# Patient Record
Sex: MALE | Race: WHITE
[De-identification: names, ages, dates, MRNs, and addresses within clinical notes are randomized per-mention and may not be internally consistent; named-entity substitution may affect disease eponyms.]

---

## 2019-08-15 ENCOUNTER — HOSPITAL ENCOUNTER (OUTPATIENT)
Dept: HOSPITAL 53 - M OPP | Age: 64
Discharge: HOME | End: 2019-08-15
Attending: SURGERY
Payer: MEDICARE

## 2019-08-15 VITALS — BODY MASS INDEX: 24.5 KG/M2 | WEIGHT: 175 LBS | HEIGHT: 71 IN

## 2019-08-15 VITALS — SYSTOLIC BLOOD PRESSURE: 163 MMHG | DIASTOLIC BLOOD PRESSURE: 77 MMHG

## 2019-08-15 DIAGNOSIS — Z12.11: Primary | ICD-10-CM

## 2019-08-15 DIAGNOSIS — K57.30: ICD-10-CM

## 2019-08-15 DIAGNOSIS — Z79.82: ICD-10-CM

## 2019-08-15 DIAGNOSIS — I10: ICD-10-CM

## 2019-08-15 DIAGNOSIS — Z86.010: ICD-10-CM

## 2019-08-15 DIAGNOSIS — Z79.899: ICD-10-CM

## 2019-08-15 DIAGNOSIS — F17.210: ICD-10-CM

## 2019-08-15 NOTE — ROOR
________________________________________________________________________________

Patient Name: Conner Venegas           Procedure Date: 8/15/2019 8:04 AM

MRN: [id number]                          Account Number: [id number]

YOB: 1955                Age: 64

Room: [facility]                            Gender: Male

Note Status: Finalized                 

________________________________________________________________________________

 

Procedure:           Colonoscopy

Indications:         High risk colon cancer surveillance: Personal history of 

                     colonic polyps

Providers:           Leo J. Gosselin Jr, MD

Referring MD:        KIKA JOSHUA MD

Requesting Provider: 

Medicines:           Propofol per Anesthesia

Complications:       No immediate complications.

________________________________________________________________________________

Procedure:           Pre-Anesthesia Assessment:

                     - Prior to the procedure, a History and Physical was 

                     performed, and patient medications and allergies were 

                     reviewed. The patient is competent. The risks and 

                     benefits of the procedure and the sedation options and 

                     risks were discussed with the patient. All questions were 

                     answered and informed consent was obtained. Patient 

                     identification and proposed procedure were verified by 

                     the physician and the nurse in the pre-procedure area and 

                     in the procedure room. Mental Status Examination: alert 

                     and oriented. Airway Examination: normal oropharyngeal 

                     airway and neck mobility. Respiratory Examination: clear 

                     to auscultation. CV Examination: normal. ASA Grade 

                     Assessment: II - A patient with mild systemic disease. 

                     After reviewing the risks and benefits, the patient was 

                     deemed in satisfactory condition to undergo the 

                     procedure. The anesthesia plan was to use moderate 

                     sedation / analgesia (conscious sedation). Immediately 

                     prior to administration of medications, the patient was 

                     re-assessed for adequacy to receive sedatives. The heart 

                     rate, respiratory rate, oxygen saturations, blood 

                     pressure, adequacy of pulmonary ventilation, and response 

                     to care were monitored throughout the procedure. The 

                     physical status of the patient was re-assessed after the 

                     procedure.

                     The Colonoscope was introduced through the anus and 

                     advanced to the cecum, identified by appendiceal orifice 

                     and ileocecal valve. The colonoscopy was performed 

                     without difficulty. The patient tolerated the procedure 

                     well. The quality of the bowel preparation was adequate.

                                                                                

Findings:

     The rectum, recto-sigmoid colon, cecum, appendiceal orifice and ileocecal 

     valve appeared normal.

     Multiple small and large-mouthed diverticula were found in the sigmoid 

     colon.

     A few small and large-mouthed diverticula were found in the descending 

     colon, transverse colon and ascending colon.

                                                                                

Impression:          - The rectum, recto-sigmoid colon, cecum, appendiceal 

                     orifice and ileocecal valve are normal.

                     - Diverticulosis in the sigmoid colon.

                     - Diverticulosis in the descending colon, in the 

                     transverse colon and in the ascending colon.

                     - No specimens collected.

Recommendation:      - Discharge patient to home (ambulatory).

                     - Repeat colonoscopy in 5 years for surveillance.

                                                                                

 

Leo Gosselin MD

_____________________

Leo J Gosselin Jr, MD

8/15/2019 8:27:26 AM

Electronically signed by Leo Gosselin Jr , MD

Number of Addenda: 0

 

Note Initiated On: 8/15/2019 8:04 AM

Estimated Blood Loss:

     Estimated blood loss: none.

## 2019-11-05 ENCOUNTER — HOSPITAL ENCOUNTER (OUTPATIENT)
Dept: HOSPITAL 53 - M WUC | Age: 64
End: 2019-11-05
Attending: NURSE PRACTITIONER
Payer: MEDICARE

## 2019-11-05 DIAGNOSIS — R42: Primary | ICD-10-CM

## 2019-11-05 DIAGNOSIS — E07.9: ICD-10-CM

## 2019-11-05 LAB
ALBUMIN SERPL BCG-MCNC: 3.9 GM/DL (ref 3.2–5.2)
ALT SERPL W P-5'-P-CCNC: 25 U/L (ref 12–78)
BASOPHILS # BLD AUTO: 0 10^3/UL (ref 0–0.2)
BASOPHILS NFR BLD AUTO: 0.3 % (ref 0–1)
BILIRUB SERPL-MCNC: 0.5 MG/DL (ref 0.2–1)
BUN SERPL-MCNC: 14 MG/DL (ref 7–18)
CALCIUM SERPL-MCNC: 9.8 MG/DL (ref 8.8–10.2)
CHLORIDE SERPL-SCNC: 103 MEQ/L (ref 98–107)
CO2 SERPL-SCNC: 28 MEQ/L (ref 21–32)
CREAT SERPL-MCNC: 0.85 MG/DL (ref 0.7–1.3)
EOSINOPHIL # BLD AUTO: 0.1 10^3/UL (ref 0–0.5)
EOSINOPHIL NFR BLD AUTO: 0.8 % (ref 0–3)
GFR SERPL CREATININE-BSD FRML MDRD: > 60 ML/MIN/{1.73_M2} (ref 49–?)
GLUCOSE SERPL-MCNC: 84 MG/DL (ref 70–100)
HCT VFR BLD AUTO: 44.5 % (ref 42–52)
HGB BLD-MCNC: 14.8 G/DL (ref 13.5–17.5)
LYMPHOCYTES # BLD AUTO: 1.6 10^3/UL (ref 1.5–5)
LYMPHOCYTES NFR BLD AUTO: 24.9 % (ref 24–44)
MCH RBC QN AUTO: 33.3 PG (ref 27–33)
MCHC RBC AUTO-ENTMCNC: 33.3 G/DL (ref 32–36.5)
MCV RBC AUTO: 100.2 FL (ref 80–96)
MONOCYTES # BLD AUTO: 0.7 10^3/UL (ref 0–0.8)
MONOCYTES NFR BLD AUTO: 11.4 % (ref 0–5)
NEUTROPHILS # BLD AUTO: 3.9 10^3/UL (ref 1.5–8.5)
NEUTROPHILS NFR BLD AUTO: 62.1 % (ref 36–66)
PLATELET # BLD AUTO: 193 10^3/UL (ref 150–450)
POTASSIUM SERPL-SCNC: 4.6 MEQ/L (ref 3.5–5.1)
PROT SERPL-MCNC: 7.2 GM/DL (ref 6.4–8.2)
RBC # BLD AUTO: 4.44 10^6/UL (ref 4.3–6.1)
SODIUM SERPL-SCNC: 137 MEQ/L (ref 136–145)
TROPONIN I SERPL-MCNC: < 0.02 NG/ML (ref ?–0.1)
TSH SERPL DL<=0.005 MIU/L-ACNC: 1.73 UIU/ML (ref 0.36–3.74)
WBC # BLD AUTO: 6.3 10^3/UL (ref 4–10)

## 2019-11-07 ENCOUNTER — HOSPITAL ENCOUNTER (INPATIENT)
Dept: HOSPITAL 53 - M ED | Age: 64
LOS: 6 days | Discharge: ANOTHER HOSPITAL | DRG: 95 | End: 2019-11-13
Attending: INTERNAL MEDICINE | Admitting: INTERNAL MEDICINE
Payer: MEDICARE

## 2019-11-07 VITALS — HEIGHT: 71 IN | BODY MASS INDEX: 23.15 KG/M2 | WEIGHT: 165.35 LBS

## 2019-11-07 VITALS — SYSTOLIC BLOOD PRESSURE: 162 MMHG | DIASTOLIC BLOOD PRESSURE: 90 MMHG

## 2019-11-07 DIAGNOSIS — G61.0: Primary | ICD-10-CM

## 2019-11-07 DIAGNOSIS — F17.200: ICD-10-CM

## 2019-11-07 DIAGNOSIS — R53.1: ICD-10-CM

## 2019-11-07 DIAGNOSIS — M84.58XA: ICD-10-CM

## 2019-11-07 DIAGNOSIS — Z85.46: ICD-10-CM

## 2019-11-07 DIAGNOSIS — E03.9: ICD-10-CM

## 2019-11-07 DIAGNOSIS — Z79.899: ICD-10-CM

## 2019-11-07 DIAGNOSIS — G70.00: ICD-10-CM

## 2019-11-07 DIAGNOSIS — G73.1: ICD-10-CM

## 2019-11-07 DIAGNOSIS — F10.288: ICD-10-CM

## 2019-11-07 DIAGNOSIS — C78.01: ICD-10-CM

## 2019-11-07 DIAGNOSIS — I10: ICD-10-CM

## 2019-11-07 DIAGNOSIS — E78.5: ICD-10-CM

## 2019-11-07 DIAGNOSIS — D75.89: ICD-10-CM

## 2019-11-07 DIAGNOSIS — Z96.642: ICD-10-CM

## 2019-11-07 DIAGNOSIS — Z79.82: ICD-10-CM

## 2019-11-07 DIAGNOSIS — H53.2: ICD-10-CM

## 2019-11-07 DIAGNOSIS — C79.51: ICD-10-CM

## 2019-11-07 DIAGNOSIS — E53.8: ICD-10-CM

## 2019-11-07 LAB
ALBUMIN SERPL BCG-MCNC: 3.9 GM/DL (ref 3.2–5.2)
ALT SERPL W P-5'-P-CCNC: 29 U/L (ref 12–78)
APPEARANCE CSF: CLEAR
BASOPHILS # BLD AUTO: 0 10^3/UL (ref 0–0.2)
BASOPHILS NFR BLD AUTO: 0.4 % (ref 0–1)
BILIRUB CONJ SERPL-MCNC: 0.1 MG/DL (ref 0–0.2)
BILIRUB SERPL-MCNC: 0.5 MG/DL (ref 0.2–1)
BUN SERPL-MCNC: 13 MG/DL (ref 7–18)
CALCIUM SERPL-MCNC: 9.1 MG/DL (ref 8.8–10.2)
CHLORIDE SERPL-SCNC: 104 MEQ/L (ref 98–107)
CK MB CFR.DF SERPL CALC: 1.46
CK MB SERPL-MCNC: 2.3 NG/ML (ref ?–3.6)
CK SERPL-CCNC: 158 U/L (ref 39–308)
CO2 SERPL-SCNC: 27 MEQ/L (ref 21–32)
COLOR CSF: COLORLESS
CREAT SERPL-MCNC: 0.91 MG/DL (ref 0.7–1.3)
EOSINOPHIL # BLD AUTO: 0.2 10^3/UL (ref 0–0.5)
EOSINOPHIL NFR BLD AUTO: 2.7 % (ref 0–3)
ETHANOL SERPL-MCNC: < 0.003 % (ref 0–0.01)
GFR SERPL CREATININE-BSD FRML MDRD: > 60 ML/MIN/{1.73_M2} (ref 49–?)
GLUCOSE CSF-MCNC: 60 MG/DL (ref 40–75)
GLUCOSE SERPL-MCNC: 95 MG/DL (ref 70–100)
HCT VFR BLD AUTO: 42.9 % (ref 42–52)
HGB BLD-MCNC: 14.4 G/DL (ref 13.5–17.5)
INR PPP: 1.01
LYMPHOCYTES # BLD AUTO: 2.8 10^3/UL (ref 1.5–5)
LYMPHOCYTES NFR BLD AUTO: 38.7 % (ref 24–44)
MAGNESIUM SERPL-MCNC: 2 MG/DL (ref 1.8–2.4)
MCH RBC QN AUTO: 33.6 PG (ref 27–33)
MCHC RBC AUTO-ENTMCNC: 33.6 G/DL (ref 32–36.5)
MCV RBC AUTO: 100 FL (ref 80–96)
MONOCYTES # BLD AUTO: 0.7 10^3/UL (ref 0–0.8)
MONOCYTES NFR BLD AUTO: 9.8 % (ref 0–5)
NEUTROPHILS # BLD AUTO: 3.5 10^3/UL (ref 1.5–8.5)
NEUTROPHILS NFR BLD AUTO: 48.1 % (ref 36–66)
PLATELET # BLD AUTO: 249 10^3/UL (ref 150–450)
POTASSIUM SERPL-SCNC: 4.4 MEQ/L (ref 3.5–5.1)
PROT CSF-MCNC: 73 MG/DL (ref 15–45)
PROT SERPL-MCNC: 7.3 GM/DL (ref 6.4–8.2)
PROTHROMBIN TIME: 13 SECONDS (ref 11.8–14)
RBC # BLD AUTO: 4.29 10^6/UL (ref 4.3–6.1)
SODIUM SERPL-SCNC: 138 MEQ/L (ref 136–145)
TROPONIN I SERPL-MCNC: < 0.02 NG/ML (ref ?–0.1)
TSH SERPL DL<=0.005 MIU/L-ACNC: 4.01 UIU/ML (ref 0.36–3.74)
TUBE # CSF: (no result)
WBC # BLD AUTO: 7.3 10^3/UL (ref 4–10)

## 2019-11-07 PROCEDURE — 009U3ZX DRAINAGE OF SPINAL CANAL, PERCUTANEOUS APPROACH, DIAGNOSTIC: ICD-10-PCS | Performed by: NURSE PRACTITIONER

## 2019-11-07 NOTE — REPVR
PROCEDURE INFORMATION: 

Exam: MR Angiogram Head Without Contrast, Arteries 

Exam date and time: 11/7/2019 9:49 PM 

Clinical history: 64 years old, male; Patient HX: Severe bilateral le weakness 

for several days; Additional info: Neuro deficits 



TECHNIQUE: 

Imaging protocol: MR angiogram head without contrast. Exam focused on the 

arteries. 

3D rendering: MIP reconstructed images were created and reviewed. 



COMPARISON: 

No relevant prior studies available. 



FINDINGS: 

Right internal carotid artery: Unremarkable. Intracranial segment is patent 

with no significant stenosis. No aneurysm. 

Right anterior cerebral artery: Unremarkable. No occlusion or significant 

stenosis. No aneurysm. 

Right middle cerebral artery: Unremarkable. No occlusion or significant 

stenosis. No aneurysm. 

Right posterior cerebral artery: Unremarkable. No occlusion or significant 

stenosis. No aneurysm. 

Right vertebral artery: Unremarkable. No occlusion or significant stenosis. No 

aneurysm. 



Left internal carotid artery: Unremarkable. Intracranial segment is patent with 

no significant stenosis. No aneurysm. 

Left anterior cerebral artery: Unremarkable. No occlusion or significant 

stenosis. No aneurysm. 

Left middle cerebral artery: Unremarkable. No occlusion or significant 

stenosis. No aneurysm. 

Left posterior cerebral artery: Unremarkable. No occlusion or significant 

stenosis. No aneurysm. 

Left vertebral artery: Left vertebral artery ends in PICA. The left vertebral 

artery continues as the basilar artery. 

Basilar artery: Unremarkable. No occlusion or significant stenosis. No 

aneurysm. 



IMPRESSION: 

No acute intracranial abnormality.



Electronically signed by: Missael Castaneda On 11/07/2019  22:11:12 PM

## 2019-11-07 NOTE — REPVR
PROCEDURE INFORMATION: 

Exam: MR Head Without Contrast 

Exam date and time: 11/7/2019 9:49 PM 

Clinical history: 64 years old, male; Weakness, extremity; Patient HX: Severe 

bilateral le weakness for several days; Additional info: Neuro deficits 



TECHNIQUE: 

Imaging protocol: MR of the head without contrast. 



COMPARISON: 

No relevant prior studies available. 



FINDINGS: 

Brain: No acute infarct. Mild chronic microvascular ischemic changes. 

Ventricles: Normal. No ventriculomegaly. 

Bones/joints: Unremarkable. 

Soft tissues: Unremarkable. 

Sinuses: Normal as visualized. No acute sinusitis. 

Mastoid air cells: Normal as visualized. No mastoid effusion. 

Orbits: Unremarkable. 



Other findings: No hemorrhage. 



IMPRESSION: 

No acute intracranial abnormality.



Electronically signed by: Missael Castaneda On 11/07/2019  22:05:29 PM

## 2019-11-07 NOTE — HPEPDOC
[facility] Medical History & Physical


Date of Admission


Nov 7, 2019


Date of Service:  Nov 7, 2019


Primary Care Physician:  A


Other Provider


Oj Altman MD


Attending Physician:  MINGO BURNS MD





History and Physical


TIME OF SERVICE: 11:15 PM


   


CHIEF COMPLAINT: Weakness





HISTORY OF PRESENT ILLNESS: This is a 64 year old male who presents with 

complaints of gradually worsening lower extremity weakness. Last Saturday in the

morning he felt like he "pulled"something in his left calf; as the day went on 

he had an abnormal sensation moving up and down his leg. On Sunday, the same 

thing occurred to his right leg. Over the next few days both legs gradually 

became weaker; yesterday he was able to walk with a cane but today he was unable

to hold himself up, or get up or to walk, and had a fall this afternoon which 

prompted him to come to the hospital. He denies hitting his head as a result of 

the fall. Other symptoms include "double vision" and lower back pain. He denies 

having fevers, denies having difficulty swallowing, denies having difficulty 

breathing, denies having trauma to his back prior to developing the symptoms, 

denies having cough, denies having a runny nose, denies having congestion, 

denies having nausea, and denies having vomiting.





REVIEW OF SYSTEMS: 12 point review of systems negative except as listed in HPI





PAST MEDICAL/ SURGICAL HISTORY:


Chronic hypertension.


Dyslipidemia.


History of prostate cancer status post prostatectomy in 2007


Left knee total replacement





SOCIAL HISTORY:


Smokes


Drink 6 beers daily





FAMILY HISTORY:


Father had brain aneurysm


   


ALLERGIES: Please see below.





HOME MEDICATIONS: Please see below. 





PHYSICAL EXAMINATION:


VITAL SIGNS:  Please see below.


GENERAL APPEARANCE: Well-nourished, well-developed, not in apparent distress


HEENT: Normocephalic, atraumatic, mucous membranes moist and pink, left pupil 

does not appear to react to light.


CARDIOVASCULAR: Regular rate and rhythm. No murmurs, rubs or gallops


LUNGS: Clear to auscultation bilaterally on room air


ABDOMEN: Abdomen is soft and nontender on palpation


MUSCULOSKELETAL:  strength is 5 /5 in both upper extremities, 3/5 at the left 

hip and knee  and +3/5 at the right knee and hip/ negative straight leg test 

bilaterally


INTEGUMENT: He does not have generalized pallor and is not flushed 


NEUROLOGICAL:Cranial nerves II-12 except for pupillary reflex at the left are 

intact / sensation is intact both lower extremities / there is no clonus / DTR 0

at left knee, 1 at right knee, 2 at both biceps


PSYCHIATRIC: Allergic oriented to person, place and time, able to understand and

follow commands





LABORATORY DATA: See below.





IMAGING: 


MRI brain " IMPRESSION: No acute intracranial abnormality."





MICROBIOLOGY: Please see below. 





ASSESSMENT: 


Mr. Venegas 64-year-old male with a past medical history of hypertension, 

dyslipidemia, and prostate cancer in remission, who will be admitted for 

evaluation of bilateral lower extremity weakness who's cause is TBD.





PLAN:


1. Bilateral lower extremity weakness 


Cause TBD


Differential includes GB vs spinal cord impingement.


LP results were reviewed.


Plan: admit to [facility] / aspiration precautions / fall precautions /  per d/w 

(Neuro) will order MRI of thoracic and lumbar spine tonight / elevate head of 

bed /f/u B12 and B1 levels / PT eval to determine if he will need walking aides 

and or rehab





2. Back pain


Cause TBD


Patient denies trauma to the back. 


Plan: f/u MRI of spine / lidocaine patch, ibuprofen and tramadol for pain 





3. Chronic HTN


Acute elevation 2/2 back pain and or alcohol w/d


Plan: c/w home meds / control pain





4. Alcohol Dependence


He is unsure if he will develop seizures if he doesn't drink bc he drinks daily.


The Macrocytosis is likely due to alcoholism


Plan: fall & seizure precautions / ativan per CIWA protocol / thiamine, folic 

acid and MVI





5. Tobacco Abuse


Plan: nicotine patch / smoking cessation education





DVT prophylaxis with Lovenox





Disposition pending clinical course





Vital Signs





Vital Signs








  Date Time  Temp Pulse Resp B/P (MAP) Pulse Ox O2 Delivery O2 Flow Rate FiO2


 


11/7/19 23:19  72 16  95 Room Air  


 


11/7/19 22:49    135/66 (89)    


 


11/7/19 19:37 99.0       











Laboratory Data


Labs 24H


Laboratory Tests 2


11/7/19 19:38: 


Immature Granulocyte % (Auto) 0.3, Neutrophils (%) (Auto) 48.1, Lymphocytes (%) 

(Auto) 38.7, Monocytes (%) (Auto) 9.8H, Eosinophils (%) (Auto) 2.7, Basophils 

(%) (Auto) 0.4, Neutrophils # (Auto) 3.5, Lymphocytes # (Auto) 2.8, Monocytes # 

(Auto) 0.7, Eosinophils # (Auto) 0.2, Basophils # (Auto) 0.0, Nucleated Red 

Blood Cells % (auto) 0.0, Prothrombin Time 13.0, Prothromb Time International 

Ratio 1.01, Anion Gap 7L, Glomerular Filtration Rate > 60.0, Calcium Level 9.1, 

Magnesium Level 2.0, Total Bilirubin 0.5, Direct Bilirubin 0.1, Aspartate Amino 

Transf (AST/SGOT) 25, Alanine Aminotransferase (ALT/SGPT) 29, Alkaline Phos

phatase 88, Total Creatine Kinase 158, Creatine Kinase MB 2.3, Creatine Kinase 

MB Relative Index 1.46, Troponin I < 0.02, Total Protein 7.3, Albumin 3.9, 

Albumin/Globulin Ratio 1.15, Thyroid Stimulating Hormone (TSH) 4.010H, Ethyl 

Alcohol Level < 0.003


11/7/19 22:56: 


CSF Appearance CLEAR, CSF Color COLORLESS, CSF WBC (Auto) 3, CSF RBC (Auto) < 2,

CSF Glucose (Tube 1) TUBE 3, CSF Total Protein (Tube 1) TUBE 4, CSF Cell Count 

Tube # TUBE 1, CSF Polynuclear WBCs (%) , CSF Glucose 60, CSF Total Protein 73H


11/7/19 23:00: 


CSF Appearance CLEAR, CSF Color COLORLESS, CSF WBC (Auto) 1, CSF RBC (Auto) < 2,

CSF Cell Count Tube # TUBE 4, CSF Polynuclear WBCs (%)


CBC/BMP


Laboratory Tests


11/7/19 19:38








Microbiology





Microbiology


11/7/19 Gram Stain, Received


          Pending


11/7/19 CSF Culture, Received


          Pending





Home Medications


Scheduled


Amlodipine/Benazepril (Lotrel 5-20 mg Capsule) 1 Each Capsule, 1 CAP PO QHS


Aspirin (Aspirin EC) 81 Mg Tablet.dr, 81 MG PO DAILY


Atorvastatin Calcium (Atorvastatin Calcium) 10 Mg Tab, 10 MG PO QHS


Venlafaxine HCl (Effexor Xr) 150 Mg Cap.er.24h, 150 MG PO DAILY





Scheduled PRN


Calcium Carbonate (Tums) 500 Mg Chw, 1,000 MG PO Q4H PRN for 

HEARTBURN/INDIGESTION


Loratadine (Loratadine) 10 Mg Tablet, 10 MG PO DAILY PRN for ALLERGY SYMPTOMS


Oxymetazoline HCl (No Drip) 0.05% Spray, 2 SPRAYS NA QHS PRN for NASAL 

CONGESTION





Allergies


Coded Allergies:  


     No Known Allergies (Unverified , 11/7/19)





A-FIB/CHADSVASC


A-FIB History


Current/History of A-Fib/PAF?:  No


Current PO Anticoag Therapy:  No











MINGO BURNS MD                 Nov 7, 2019 23:38

## 2019-11-08 VITALS — SYSTOLIC BLOOD PRESSURE: 149 MMHG | DIASTOLIC BLOOD PRESSURE: 78 MMHG

## 2019-11-08 VITALS — DIASTOLIC BLOOD PRESSURE: 81 MMHG | SYSTOLIC BLOOD PRESSURE: 138 MMHG

## 2019-11-08 VITALS — DIASTOLIC BLOOD PRESSURE: 82 MMHG | SYSTOLIC BLOOD PRESSURE: 172 MMHG

## 2019-11-08 VITALS — SYSTOLIC BLOOD PRESSURE: 162 MMHG | DIASTOLIC BLOOD PRESSURE: 90 MMHG

## 2019-11-08 VITALS — DIASTOLIC BLOOD PRESSURE: 99 MMHG | SYSTOLIC BLOOD PRESSURE: 160 MMHG

## 2019-11-08 LAB
BUN SERPL-MCNC: 14 MG/DL (ref 7–18)
CALCIUM SERPL-MCNC: 8.9 MG/DL (ref 8.8–10.2)
CHLORIDE SERPL-SCNC: 104 MEQ/L (ref 98–107)
CK SERPL-CCNC: 190 U/L (ref 39–308)
CO2 SERPL-SCNC: 29 MEQ/L (ref 21–32)
CREAT SERPL-MCNC: 0.8 MG/DL (ref 0.7–1.3)
ERYTHROCYTE [SEDIMENTATION RATE] IN BLOOD BY WESTERGREN METHOD: 6 MM/HR (ref 0–20)
FOLATE SERPL-MCNC: 14.2 NG/ML (ref 5.4–?)
GFR SERPL CREATININE-BSD FRML MDRD: > 60 ML/MIN/{1.73_M2} (ref 49–?)
GLUCOSE SERPL-MCNC: 100 MG/DL (ref 70–100)
HCT VFR BLD AUTO: 40 % (ref 42–52)
HGB BLD-MCNC: 13.3 G/DL (ref 13.5–17.5)
MCH RBC QN AUTO: 33.3 PG (ref 27–33)
MCHC RBC AUTO-ENTMCNC: 33.3 G/DL (ref 32–36.5)
MCV RBC AUTO: 100.3 FL (ref 80–96)
PLATELET # BLD AUTO: 244 10^3/UL (ref 150–450)
POTASSIUM SERPL-SCNC: 3.9 MEQ/L (ref 3.5–5.1)
RBC # BLD AUTO: 3.99 10^6/UL (ref 4.3–6.1)
SODIUM SERPL-SCNC: 138 MEQ/L (ref 136–145)
VIT B12 SERPL-MCNC: 265 PG/ML (ref 247–911)
VIT B12 SERPL-MCNC: 360 PG/ML (ref 247–911)
WBC # BLD AUTO: 6.6 10^3/UL (ref 4–10)

## 2019-11-08 RX ADMIN — MORPHINE SULFATE PRN MG: 2 INJECTION, SOLUTION INTRAMUSCULAR; INTRAVENOUS at 20:19

## 2019-11-08 RX ADMIN — CYANOCOBALAMIN TAB 500 MCG SCH MCG: 500 TAB at 13:02

## 2019-11-08 RX ADMIN — LIDOCAINE SCH PATCH: 50 PATCH CUTANEOUS at 00:26

## 2019-11-08 RX ADMIN — DIATRIZOATE MEGLUMINE AND DIATRIZOATE SODIUM SCH ML: 600; 100 SOLUTION ORAL; RECTAL at 09:53

## 2019-11-08 RX ADMIN — MORPHINE SULFATE PRN MG: 2 INJECTION, SOLUTION INTRAMUSCULAR; INTRAVENOUS at 07:13

## 2019-11-08 RX ADMIN — MORPHINE SULFATE PRN MG: 2 INJECTION, SOLUTION INTRAMUSCULAR; INTRAVENOUS at 04:29

## 2019-11-08 RX ADMIN — MORPHINE SULFATE PRN MG: 2 INJECTION, SOLUTION INTRAMUSCULAR; INTRAVENOUS at 13:03

## 2019-11-08 RX ADMIN — ASPIRIN SCH MG: 81 TABLET ORAL at 09:15

## 2019-11-08 RX ADMIN — BENAZEPRIL HYDROCHLORIDE SCH MG: 20 TABLET, COATED ORAL at 20:19

## 2019-11-08 RX ADMIN — VENLAFAXINE HYDROCHLORIDE SCH MG: 75 CAPSULE, EXTENDED RELEASE ORAL at 09:15

## 2019-11-08 RX ADMIN — BENAZEPRIL HYDROCHLORIDE SCH MG: 20 TABLET, COATED ORAL at 00:50

## 2019-11-08 RX ADMIN — NICOTINE SCH PATCH: 14 PATCH, EXTENDED RELEASE TRANSDERMAL at 09:18

## 2019-11-08 RX ADMIN — ENOXAPARIN SODIUM SCH MG: 40 INJECTION SUBCUTANEOUS at 09:16

## 2019-11-08 RX ADMIN — Medication SCH: at 02:26

## 2019-11-08 RX ADMIN — NICOTINE SCH PATCH: 14 PATCH, EXTENDED RELEASE TRANSDERMAL at 04:29

## 2019-11-08 RX ADMIN — FOLIC ACID SCH MG: 1 TABLET ORAL at 09:16

## 2019-11-08 RX ADMIN — DIATRIZOATE MEGLUMINE AND DIATRIZOATE SODIUM SCH ML: 600; 100 SOLUTION ORAL; RECTAL at 10:27

## 2019-11-08 RX ADMIN — MORPHINE SULFATE PRN MG: 2 INJECTION, SOLUTION INTRAMUSCULAR; INTRAVENOUS at 15:50

## 2019-11-08 RX ADMIN — ACETAMINOPHEN SCH MG: 650 TABLET, FILM COATED, EXTENDED RELEASE ORAL at 05:43

## 2019-11-08 RX ADMIN — Medication SCH MG: at 00:25

## 2019-11-08 RX ADMIN — Medication SCH MG: at 09:15

## 2019-11-08 RX ADMIN — ACETAMINOPHEN SCH MG: 650 TABLET, FILM COATED, EXTENDED RELEASE ORAL at 17:05

## 2019-11-08 RX ADMIN — LIDOCAINE SCH PATCH: 50 PATCH CUTANEOUS at 20:33

## 2019-11-08 RX ADMIN — Medication SCH MG: at 20:19

## 2019-11-08 RX ADMIN — MULTIPLE VITAMINS W/ MINERALS TAB SCH TAB: TAB at 09:16

## 2019-11-08 NOTE — REPVR
PROCEDURE INFORMATION: 

Exam: MR Lumbar Spine Without Contrast. 

Exam date and time: 11/8/2019 3:31 AM 

Clinical history: 64 years old, male; Patient HX: Le weakness, negative mri/a 

brain done <24 hours ago; Additional info: Weakness and back pain 



TECHNIQUE: 

Imaging protocol: Multiplanar magnetic resonance images of the lumbar spine 

without intravenous contrast. 



COMPARISON: 

No relevant prior studies available. 



FINDINGS: 

Limitations: There is motion artifact. 

Vertebrae: There are several osseous lesions which are low in signal on T1, 

predominantly low in signal on T2, and heterogeneously increased signal on STIR 

which are suspicious for osseous metastases. The largest lesion in the lumbar 

spine involves the anterior aspect of the L1 vertebral body. There is no 

significant expansion. Additional lesions are seen in the T12 vertebral body, 

described on the MRI of the thoracic spine, as well as in the right side of the 

L5 vertebral body. There is normal alignment of the lumbar spine. 

Spinal cord: The conus medullaris terminates at the L1-L2 level. The distal 

cord appears unremarkable. 

T12-L1: Imaged on the sagittal images only. No posterior disc bulge or 

herniation. No central or foraminal stenosis. 

L1-L2: Anterior endplate spurs. No posterior disc bulge or herniation. No 

central or foraminal stenosis. 

L2-L3: Anterior endplate spurs. Minimal posterior disc bulge. No central or 

foraminal stenosis. 

L3-L4: Anterior endplate spurs. Small disc bulge. Mild facet joint hypertrophy. 

No significant central or foraminal stenosis. 

L4-L5: Anterior endplate spurs. Diffuse disc bulge with more prominent 

extension into the bilateral neural foramen than centrally. Hypertrophic facet 

arthropathy with fluid in the left facet joint. 9 mm ganglion extending 

posteriorly from the left facet joint. Mild central canal stenosis and mild 

bilateral neural foraminal narrowing. 

L5-S1: Anterior endplate spurs and moderate disc narrowing. Disc bulge more 

prominent into the bilateral neural foramen than centrally. Hypertrophic facet 

arthropathy. No significant central stenosis. Moderate left and mild right 

neural foraminal narrowing. 



Soft tissues: See L4-L5 findings. The paraspinous soft tissues appear otherwise 

unremarkable. 

Other findings: The visualized aorta is normal in size. 



IMPRESSION: 

1. Several osseous lesions, suspicious for osseous metastases. See the separate 

report for MRI of the thoracic spine. 

2. Degenerative disc disease and facet arthropathy most prominent at L4-L5 and 

L5-S1. Mild central canal stenosis at L4-L5. Bilateral neural foraminal 

narrowing at L4-L5 and L5-S1. 

Findings were discussed with MINGO BURNS at 11/8/2019 4:25 AM EST.



Electronically signed by: Mary Jo Grubbs On 11/08/2019  04:40:04 AM

## 2019-11-08 NOTE — CR
DATE OF CONSULTATION:  11/08/2019

 

REFERRING PHYSICIAN: Mason Treviño MD

 

REASON FOR CONSULTATION: Weakness.

 

HISTORY OF PRESENT ILLNESS

Conner Venegas is a 64-year-old man who was admitted at [facility]

due to progressive weakness.  The patient states that his symptoms started this

past Saturday 6 days ago when he felt a pulled muscle in his left calf.  He felt

pulled muscle in his right calf on Sunday.  He felt tightening of his muscles.

On Monday he started feeling weakness of his legs.  He started using a cane.  He

went to Urgent Care and his blood tests were unremarkable.  He saw his primary

care physician on Thursday and by then he had more difficulty walking and he had

double vision as well.  He was using a walker day before he came to [facility] emergency department.  He was scheduled to see us for consultation

at our office today but he came to [facility] last night.  His legs

are getting weaker.  He denies numbness, tingling or pain at his arms and legs.

This morning he started feeling weakness of his arms as well.  He denies any neck

pain, back pain, headaches, dysphagia, dysarthria or urinary incontinence.  He

states that if he is looking on the clock in the room he sees two clocks.  His

double vision gets better if he tilts his head or closes one eye.

 

DIAGNOSTIC STUDIES

His MRI scan of brain showed minimal small vessel disease of brain.  MRI lumbar

and thoracic spine showed multiple vertebral metastasis without spinal cord

impingement or signal changes in spinal cord.  His ESR is 6, hemoglobin 13.3,

vitamin B12 is 256 and TSH 4.01 with normal metabolic profile.

 

PAST MEDICAL HISTORY

Prostate cancer for which he had prostate resection in 2007, left knee

replacement, dyslipidemia, hypertension.

 

SOCIAL HISTORY

He has been smoking one pack per day for 40 or more years.  He has been drinking

six beers a day for 40 or more years.  He denies illicit drugs.

 

FAMILY HISTORY

Father had brain aneurysm.

 

ALLERGIES

None.

 

HOME MEDICATIONS:

Venlafaxine extended release 150 mg by mouth daily, Claritin 10 mg by mouth

daily, Lipitor 10 mg by mouth daily, aspirin 81 mg by mouth daily,

amlodipine/Lotrel 5/20 mg by mouth daily.

 

REVIEW OF SYSTEMS

All systems were reviewed and found to be noncontributory except as mentioned in

history of present illness.

 

PHYSICAL EXAMINATION

Temperature 97.9, pulse 69, respiratory rate 19, blood pressure 160/99.  Heart:

Regular rate and rhythm. Lungs clear to auscultation.  Abdomen:  Soft, nontender,

nondistended.  No pedal edema.  No musculoskeletal abnormalities.  No rash.  No

signs of meningeal irritation.  No musculoskeletal abnormalities.  The patient is

awake, alert, oriented to place, person and time.  Normal speech, comprehension

and repetition.  Extraocular muscles are intact.  No facial weakness and uvula

are midline.  Recent and distant memory is intact.  There is no nystagmus.

Strength in his right wrist extensor is 2/5 and left wrist extensor is 4-/5.

Strength in his deltoids, triceps, biceps is 4+/5.  Strength in his hip flexors

and extensors is 3-/5.  He has bilateral foot drop.  Strength in his quadriceps

is 5-/5.  Gastrocnemius bilaterally is 5/5.  Deep tendon reflexes are 1+ in knees

and brachial radialis and absent elsewhere.  Gait could not be tested.  The

patient is unable to stand up from his bed.  Normal sensation to touch, cold

vibration and pinprick.  There is no dysmetria.  There is no tremor.  There is no

improvement in his strength with repetitive activity and exercise.

 

ASSESSMENT

1.  Generalized weakness starting in legs and his arms are affected today.

2.  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

3.  Myasthenia gravis and LEMS are in differential diagnosis, although there is

no fatiguability or facilitation of muscle strength with repetitive exercise.

4.  Multiple metastatic lesions to lumbar and thoracic spine without spinal cord

involvement.

 

PLAN

1.  Intravenous immunoglobulin 40 grams daily intravenously for 4 days to make a

total dose 2 gm/kg divided over 4 days.

2.  Physical and occupational therapy and rehabilitation.

3.  Vitamin B12 2000 mcg by mouth daily.

4   PSA is pending.  The patient had prostate cancer in 2007 and had

prostatectomy.  He was found to have peripheral lung nodules during workup for

primary source of his metastatic disease.  He may have biopsy of lung nodule in

future.  He is being followed by hematology/oncology.

5.  MRI cervical spine to rule out any cervical cord lesions although he does not

have any upper motor neuron signs.

6.  Check acetylcholine receptor antibody, VGCC antibody, etc.  His CSF showed

elevated total protein with normal cells.  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

would also explain his hyporeflexia throughout and weakness.

7.  Followup with our office in 2-3 weeks after hospital discharge.

## 2019-11-08 NOTE — IPNPDOC
Text Note


Date of Service


The patient was seen on 11/8/19.





NOTE


Subjective: Continues to have profound weakness of his legs, he cannot elevate 

legs against gravity and he is unable to walk. Also patient complains of double 

vision.


 Had long discussion about MRI result and further management





Objective:VITAL SIGNS:  Please see below.


GENERAL APPEARANCE: Well-nourished, well-developed, not in apparent distress


HEENT: Normocephalic, atraumatic. Mucous members moist and pink


CARDIOVASCULAR: Regular rate and rhythm. No murmurs, rubs or gallops. Radial 

pulses are intact. There is no lower extremity edema


LUNGS: . Diminished lung sounds bilaterally


ABDOMEN: Bowel sounds are hypoactive. Abdomen is soft and nontender. 


MUSCULOSKELETAL: Patient cannot elevate his legs against gravity, reflexes 

preserved


NEUROLOGICAL: Patient has double vision on both eyes, no nystagmus, no 

peripheral vision limitations. Sensitivity of the legs preserved, no nuchal 

rigidity. Alert, oriented, awake. Speech is not dysarthric





Assessment and plan:


Patient is 64 years old male with past history of hypertension, prostate cancer 

treated with prostatectomy in 2007 presented hospital with bilateral leg 

weakness and double vision. Imaging study showed multiple spinal bone metastasis

and right lung nodules.








Bilateral leg weakness


MRI of the spine did not show compression of the spinal cord


Differential diagnosis includes paraneoplastic syndrome secondary to malignancy 

S Lamberta Eaton syndrome, myasthenia gravis. Also there is possibility for 

Gullian Barr syndrome. I discussed findings with Dr. Jimenez with neurologist, he 

recommended IVIG infusion


I ordered ab to voltage gated calcium channel, acetylcholine receptor Ab








Bone metastasis/Lung nodules


MRI of the spine showed multiple metastasis on the thoracic and lumbar spine 

level


Chest CAT scan showed right lung nodules


Patient had a history of prostate cancer, his also active smoker. Unclear the 

primary source of cancer. We will proceed with biopsy. IR guided biopsy ordered


Appreciate/agree with oncologist consult





Chronic HTN


Continue cardioprotective medication





Tobacco Abuse


Plan: nicotine patch / smoking cessation education





VS,Swati, I+O


VS, Swati, I+O


Laboratory Tests


11/7/19 19:38








11/8/19 07:45











Vital Signs








  Date Time  Temp Pulse Resp B/P (MAP) Pulse Ox O2 Delivery O2 Flow Rate FiO2


 


11/8/19 15:50   18     


 


11/8/19 14:00 97.9 69   94 Room Air  


 


11/8/19 13:54    160/99 (119)    














I&O- Last 24 Hours up to 6 AM 


 


 11/8/19





 06:00


 


Intake Total 600 ml


 


Output Total 0 ml


 


Balance 600 ml

















DON MANRIQUE DO             Nov 8, 2019 16:49

## 2019-11-08 NOTE — REP
CT of the chest with IV contrast:

There are no comparisons.

 

The patient reportedly has history of prostate carcinoma and prostatectomy.

There are multiple right lung nodules as follows:

Right upper lobe anteriorly inferiorly adjacent to the minor fissure, there are

three nodules:

12 mm near the hilus image 51.

16 mm, slightly more inferolaterally, image 50.

23 mm slightly more inferiorly , image 50.

 

Right middle lobe:

11 mm anteriorly. Image 57.

23 mm more inferiorly, image 71.

 

There are no pleural effusions.

There is no mediastinal, hilar or axillary lymphadenopathy.

The thoracic aorta is unremarkable.  Cardiac size is normal.  There is no

pericardial effusion.

There is a faintly visible blastic lesion and also a probable lytic lesion in the

T8-2 vertebral body.

There is grade 1 compression deformity of the L1 vertebral body.

There is a fracture of the right seventh rib posterolaterally, likely subacute as

there appears to be callus and healing.

 

Impression:

Multiple right lung nodules as described.

Probable blastic and lytic metastasis in the T vertebral body.  Fracture of the

right seventh rib posterolaterally, healing

Fracture of the left eighth rib posterolaterally.  Possibly pathologic.

No adenopathy.  No pleural effusion.

 

 

 

 

 

 

 

Electronically Signed by

Clifford Akhtar MD 11/08/2019 02:17 P

## 2019-11-08 NOTE — REPVR
PROCEDURE INFORMATION: 

Exam: MR Thoracic Spine Without Contrast 

Exam date and time: 11/8/2019 3:31 AM 

Clinical history: 64 years old, male; Patient HX: Le weakness, negative mri/a 

brain done <24 hours ago; Additional info: Weakness and back pain 



TECHNIQUE: 

Imaging protocol: Multiplanar magnetic resonance images of the thoracic spine 

without contrast. 



COMPARISON: 

No relevant prior studies available. 



FINDINGS: 

Vertebrae: There multiple osseous lesions suspicious for malignancy, which are 

low in signal on T1, predominantly low in signal on T2 and heterogeneously 

increased signal on STIR. The largest lesion involves the T7 vertebral body and 

posterior elements. There is no loss of height or significant expansion of the 

T7 vertebral body. There is mild expansion of the left T7 lamina. Additional, 

smaller lesions are seen within the T1 vertebral body and the T1 spinous 

process, the T2 vertebral body, the left T5 transverse process, the T6 

vertebral body, the superior aspect of the T8 vertebral body and in the far 

right side of the T12 vertebral body. There is mild loss of height of the T12 

vertebral body with slight depression of the superior endplate, but no 

associated abnormal signal within superior endplate. 

Spinal cord: The thoracic spinal cord is normal in signal and morphology. 

Discs/Spinal canal/Neural foramina: There is a small disc osteophyte complex at 

T11-T12, asymmetric to the left in the paracentral location, not resulting in 

significant stenosis of the spinal canal. No significant spinal canal stenosis 

is seen. 



Soft tissues: The paraspinous soft tissues appear unremarkable. 

Vasculature: The visualized aorta is normal in size. 



IMPRESSION: 

1. Multiple osseous lesions, highly suspicious for metastases. The largest 

involves the T7 vertebral body and posterior elements with mild expansion of 

the left lamina. 

2. No significant encroachment upon the spinal canal. Normal signal of the 

thoracic spinal cord. 



Electronically signed by: Mary Jo Grubbs On 11/08/2019  04:22:37 AM

## 2019-11-08 NOTE — CR
DATE OF HEMATOLOGY/ONCOLOGY CONSULTATION:  11/08/2019

 

This is a very pleasant 64-year-old gentleman who had come into the hospital due

to development of gradually worsening of lower leg weakness.  The patient stated

that he felt that he may have pulled something in his left thigh or calf and had

found an almost odd electrical type sensation going up and down his leg.  The

patient then stated approximately 12 hours later the same thing had occurred to

his right lower leg.  His legs had become weaker and he started to use a cane.

It was at this time that the patient had gone to the emergency room for

evaluation.  He had no signs of any trauma and he has not received any

immunizations in the past few months.  Concern was that he may have had some

spinal cord impingement and an MRI of the thoracic and the lumbar spine were done

on 11/08/2019.  The patient was found to have multiple osseous lesions suspicious

for malignancy, predominately on the largest lesion involving T7 vertebral body

and its posterior elements.  There was no significant loss of height or expansion

or any extension into the spinal cord.  Other areas of disease were found at the

left T5 transverse process T6, T8, T12.  The spinal cord however was normal in

its signal and morphology.  No encroachment on the spinal cord in the thoracic or

in the lumbar region.  The patient then had a CT scan of the chest which had

shown multiple right lung nodules, probably blastic and lytic mets as noted,

fracture of the right 7th rib and a fracture of the left 8th rib posteriorly most

likely pathological.  The patient had a 12 mm lesion near the hilus and a 16 mm

lesion inferolaterally and a 23 mm slightly more inferiorly.  There was no sign

of any pleural effusion at that time.  The patient is currently in the hospital

bed, his wife was at the bedside for further discussions to give history as well.

 

 

PAST MEDICAL HISTORY:

Includes prostate carcinoma status post prostatectomy in 2007, left total knee

replacement, dyslipidemia, chronic hypertension.  He takes about six beers a day.

He continues to smoke at least a pack a day and has about a 50-60 pack-year

history.

 

FAMILY HISTORY:  His father had a brain aneurysm.

 

ALLERGIES:  NO KNOWN DRUG ALLERGIES.

 

CURRENT MEDICATIONS:

Include Effexor 150 mg daily, loratadine 10 mg p.o. daily, atorvastatin 10 mg

q.h.s., aspirin 81 mg p.o. daily and amlodipine Lotrel 5/20 mg one p.o. q.h.s.

 

REVIEW OF SYSTEMS:

He has had no headache, visual disturbances.  No numbness or tingling in his

upper extremities.  No shortness of breath.  No hemoptysis.  No nausea or

vomiting.  No current problems with controlling his bowel or his bladder and he

does relate having numbness and tingling and inability to lift both of his legs

and does need assistance getting in and out of bed.  He states that he is able to

stand but moving his legs are a whole other story.

 

LABORATORY:

The patient had a WBC count of 6.6, hemoglobin of 13 over 40, RDW is 12.5,

platelets are 244, serum chemistries, PSA is currently pending.  His TSH is 4.10.

Sodium is 138, potassium 3.9, chloride 104, CO2 29, BUN 14, creatinine 0.8,

fasting glucose of 100 and creatinine kinase of 190.

 

Coagulation studies show a PT of 13, INR of 1.10.

 

The patient had a spinal tap lumbar puncture on 11/07/2019 showing clear color,

WBCs with 3, RBCs were less than 2, his glucose was 60, total protein was 73.

 

Cytology was not requested.  However was added today.  Specimen is available in

the cytology lab and will be run on Monday which will be November 18.  Imaging

studies are above noted.

 

ASSESSMENT:

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

carcinoma with neurological findings consistent with either a paraneoplastic

syndrome, anti-Hu antibodies or Guillain-Flintstone type syndrome.

 

PLAN:

Followup on the cytology, have neurology evaluate the patient as well, get

vitamin B12 and folate levels due to history of EtOH.

## 2019-11-08 NOTE — ECGEPIP
[facility] - ED

                                       

                                       Test Date:    2019

Pat Name:     YEVGENIY SCHAEFER           Department:   

Patient ID:   [id number]                 Room:         [place] [address]

Gender:       Male                     Technician:   ANITHA

:          1955               Requested By: DOREEN KEYS

Order Number: [id number]     Reading MD:   Eva Hoff

                                 Measurements

Intervals                              Axis          

Rate:         73                       P:            77

NH:           188                      QRS:          40

QRSD:         78                       T:            70

QT:           368                                    

QTc:          406                                    

                           Interpretive Statements

SINUS RHYTHM

baseline artifact may affect interpretation

INCREASED RATE 16

Electronically Signed on 2019 13:34:36 EST by Eva Hoff

## 2019-11-08 NOTE — REP
CT of the abdomen and pelvis with IV and oral contrast:

The studies performed contiguously with the chest CT this same date.

The patient reportedly has a history of prostate carcinoma and prostatectomy.

Present for a to the chest CT for findings in the chest.

The hepatic parenchyma.  There is a faintly visible enhancing lesion in the right

lobe of the liver on image 30 measuring 20 mm.  Upon review this is unchanged

from the prior abdomen/pelvis CT dated 09/09/2016 and is likely an hemangioma.

The hepatic parenchyma is otherwise homogeneous.  There are no other hepatic

masses.

The gallbladder, pancreas and spleen are normal size and unremarkable.

The adrenals are unremarkable.

There is a nonobstructive 3 mm calculus in the left renal lower pole.  There is

an exophytic Bosniak type 1 left renal cyst at the lower pole measuring 2.2 cm.

The kidneys are otherwise unremarkable.

The abdominal aorta is unremarkable.  There is no periaortic adenopathy or mass.

The bowel and mesentery are unremarkable.

Pelvis:

The appendix is unremarkable.

There is no pelvic adenopathy or ascites.

There is a penile implant.  This is unchanged.

There are no lytic, blastic or destructive skeletal changes.

There is grade 1 compression deformity of the T12 vertebral body.

There is degenerative disc disease at T11-12 and T12-L1 and L5-S1.

No lytic, blastic or destructive skeletal changes are identified in the lumbar

spine or pelvis.

Impression:

No evidence of metastatic disease.  No skeletal metastases.

Nonobstructive left renal calculus.  Benign left renal cyst.

No retroperitoneal or abdominal adenopathy or mass.

Penile implant.

 

 

Electronically Signed by

Clifford Akhtar MD 11/08/2019 02:48 P

## 2019-11-09 VITALS — SYSTOLIC BLOOD PRESSURE: 161 MMHG | DIASTOLIC BLOOD PRESSURE: 75 MMHG

## 2019-11-09 VITALS — SYSTOLIC BLOOD PRESSURE: 138 MMHG | DIASTOLIC BLOOD PRESSURE: 77 MMHG

## 2019-11-09 VITALS — DIASTOLIC BLOOD PRESSURE: 76 MMHG | SYSTOLIC BLOOD PRESSURE: 164 MMHG

## 2019-11-09 VITALS — SYSTOLIC BLOOD PRESSURE: 173 MMHG | DIASTOLIC BLOOD PRESSURE: 75 MMHG

## 2019-11-09 VITALS — DIASTOLIC BLOOD PRESSURE: 75 MMHG | SYSTOLIC BLOOD PRESSURE: 148 MMHG

## 2019-11-09 VITALS — DIASTOLIC BLOOD PRESSURE: 85 MMHG | SYSTOLIC BLOOD PRESSURE: 160 MMHG

## 2019-11-09 VITALS — DIASTOLIC BLOOD PRESSURE: 65 MMHG | SYSTOLIC BLOOD PRESSURE: 132 MMHG

## 2019-11-09 VITALS — DIASTOLIC BLOOD PRESSURE: 75 MMHG | SYSTOLIC BLOOD PRESSURE: 165 MMHG

## 2019-11-09 VITALS — DIASTOLIC BLOOD PRESSURE: 79 MMHG | SYSTOLIC BLOOD PRESSURE: 159 MMHG

## 2019-11-09 VITALS — DIASTOLIC BLOOD PRESSURE: 76 MMHG | SYSTOLIC BLOOD PRESSURE: 160 MMHG

## 2019-11-09 VITALS — SYSTOLIC BLOOD PRESSURE: 154 MMHG | DIASTOLIC BLOOD PRESSURE: 95 MMHG

## 2019-11-09 VITALS — SYSTOLIC BLOOD PRESSURE: 159 MMHG | DIASTOLIC BLOOD PRESSURE: 73 MMHG

## 2019-11-09 VITALS — SYSTOLIC BLOOD PRESSURE: 153 MMHG | DIASTOLIC BLOOD PRESSURE: 76 MMHG

## 2019-11-09 LAB
BUN SERPL-MCNC: 13 MG/DL (ref 7–18)
CALCIUM SERPL-MCNC: 9 MG/DL (ref 8.8–10.2)
CHLORIDE SERPL-SCNC: 101 MEQ/L (ref 98–107)
CO2 SERPL-SCNC: 27 MEQ/L (ref 21–32)
CREAT SERPL-MCNC: 0.81 MG/DL (ref 0.7–1.3)
GFR SERPL CREATININE-BSD FRML MDRD: > 60 ML/MIN/{1.73_M2} (ref 49–?)
GLUCOSE SERPL-MCNC: 82 MG/DL (ref 70–100)
MAGNESIUM SERPL-MCNC: 2.2 MG/DL (ref 1.8–2.4)
POTASSIUM SERPL-SCNC: 4.1 MEQ/L (ref 3.5–5.1)
PSA SERPL DL<=0.01 NG/ML-MCNC: <0.006 NG/ML (ref 0–4)
PSA SERPL-MCNC: (no result) NG/ML
SODIUM SERPL-SCNC: 137 MEQ/L (ref 136–145)

## 2019-11-09 RX ADMIN — LIDOCAINE SCH PATCH: 50 PATCH CUTANEOUS at 20:11

## 2019-11-09 RX ADMIN — Medication SCH: at 09:00

## 2019-11-09 RX ADMIN — ACETAMINOPHEN SCH MG: 650 TABLET, FILM COATED, EXTENDED RELEASE ORAL at 17:07

## 2019-11-09 RX ADMIN — ENOXAPARIN SODIUM SCH MG: 40 INJECTION SUBCUTANEOUS at 09:04

## 2019-11-09 RX ADMIN — MULTIPLE VITAMINS W/ MINERALS TAB SCH TAB: TAB at 09:03

## 2019-11-09 RX ADMIN — MORPHINE SULFATE PRN MG: 2 INJECTION, SOLUTION INTRAMUSCULAR; INTRAVENOUS at 07:17

## 2019-11-09 RX ADMIN — NICOTINE SCH PATCH: 14 PATCH, EXTENDED RELEASE TRANSDERMAL at 09:04

## 2019-11-09 RX ADMIN — Medication SCH MG: at 20:10

## 2019-11-09 RX ADMIN — ASPIRIN SCH MG: 81 TABLET ORAL at 09:02

## 2019-11-09 RX ADMIN — CYANOCOBALAMIN TAB 500 MCG SCH MCG: 500 TAB at 09:03

## 2019-11-09 RX ADMIN — ACETAMINOPHEN SCH MG: 650 TABLET, FILM COATED, EXTENDED RELEASE ORAL at 05:18

## 2019-11-09 RX ADMIN — VENLAFAXINE HYDROCHLORIDE SCH MG: 75 CAPSULE, EXTENDED RELEASE ORAL at 09:02

## 2019-11-09 RX ADMIN — Medication SCH MG: at 09:02

## 2019-11-09 RX ADMIN — FOLIC ACID SCH MG: 1 TABLET ORAL at 09:02

## 2019-11-09 RX ADMIN — MORPHINE SULFATE PRN MG: 2 INJECTION, SOLUTION INTRAMUSCULAR; INTRAVENOUS at 00:08

## 2019-11-09 RX ADMIN — MORPHINE SULFATE PRN MG: 2 INJECTION, SOLUTION INTRAMUSCULAR; INTRAVENOUS at 04:24

## 2019-11-09 RX ADMIN — BENAZEPRIL HYDROCHLORIDE SCH MG: 20 TABLET, COATED ORAL at 20:10

## 2019-11-09 NOTE — IPNPDOC
Text Note


Date of Service


The patient was seen on 11/9/19.





NOTE


Subjective: Continues to have profound weakness of his legs, he cannot elevate 

legs against gravity and he is unable to walk. Continues to have the double 

vision. Also patient developed decreased flexibility of his fingers of both 

hands. Also has increased weakness of both arms which was not present on the 

admission. Patient stated that he doesn't want biopsy of his lungs or spine 

until Monday, he wants to see  improvement in his weakness on current treatment,

then proceed with biopsy








Objective:VITAL SIGNS:  Please see below.


GENERAL APPEARANCE: Well-nourished, well-developed, not in apparent distress


HEENT: Normocephalic, atraumatic. Mucous members moist and pink


CARDIOVASCULAR: Regular rate and rhythm. No murmurs, rubs or gallops. Radial 

pulses are intact. There is no lower extremity edema


LUNGS: . Diminished lung sounds bilaterally


ABDOMEN: Bowel sounds are hypoactive. Abdomen is soft and nontender. 


MUSCULOSKELETAL: Patient cannot elevate his legs against gravity, there is 

decreased strength of the left arm 3 out of 5, right arm for about 5


NEUROLOGICAL: Patient has double vision on both eyes, no nystagmus, no 

peripheral vision limitations. Sensitivity of the legs preserved, no nuchal 

rigidity. Alert, oriented, awake. Speech is not dysarthric





Assessment and plan:


Patient is 64 years old male with past history of hypertension, prostate cancer 

treated with prostatectomy in 2007 presented hospital with bilateral leg 

weakness and double vision. Imaging study showed multiple spinal bone metastasis

and right lung nodules.








Bilateral leg weakness/ arms weakness


On 11/8/19 MRI of the spine did not show compression of the spinal cord


Differential diagnosis includes paraneoplastic syndrome secondary to malignancy 

S Lamberta Eaton syndrome, myasthenia gravis, Gullian Weeks syndrome. I discussed

findings with neurologist Dr. Jimenez , he recommended IVIG infusion for possible 

Guillain-[place] syndrome.  CSF showed elevated total protein with normal cells.  

This can be suggestive of GBS which would also explain his hyporeflexia 

throughout and weakness


On physical exam 11/9/19 patient presented with increased weakness of both arms.

No any difficulties in breathing


Oncologist Dr. Lopez follows pt 


Continue to monitor  neuro check, pulmonary function


Await ab to voltage gated calcium channel, acetylcholine receptor Ab


vitamin B12 is 256 and TSH 4.01 


await PSA





Bone metastasis/Lung nodules


MRI of the spine showed multiple metastasis on the thoracic and lumbar spine 

level


Chest CAT scan showed right lung nodules


Patient had a history of prostate cancer, his also active smoker. Unclear the 

primary source of cancer. We will proceed with biopsy. IR guided biopsy ordered


Await CSF cytology





Chronic HTN


Continue cardioprotective medication





Tobacco Abuse


Plan: nicotine patch / smoking cessation education





ETOH abuse


CIWA





VS,Fishbone, I+O


VS, Fishbone, I+O


Laboratory Tests


11/9/19 04:53











Vital Signs








  Date Time  Temp Pulse Resp B/P (MAP) Pulse Ox O2 Delivery O2 Flow Rate FiO2


 


11/9/19 08:00       2.0 


 


11/9/19 08:00 98.1 82 16 159/73 (101) 96 Nasal Cannula  














I&O- Last 24 Hours up to 6 AM 


 


 11/9/19





 06:00


 


Intake Total 900 ml


 


Output Total 1300 ml


 


Balance -400 ml

















DON MANRIQUE DO             Nov 9, 2019 12:07

## 2019-11-10 VITALS — SYSTOLIC BLOOD PRESSURE: 160 MMHG | DIASTOLIC BLOOD PRESSURE: 89 MMHG

## 2019-11-10 VITALS — DIASTOLIC BLOOD PRESSURE: 77 MMHG | SYSTOLIC BLOOD PRESSURE: 155 MMHG

## 2019-11-10 VITALS — SYSTOLIC BLOOD PRESSURE: 156 MMHG | DIASTOLIC BLOOD PRESSURE: 81 MMHG

## 2019-11-10 VITALS — SYSTOLIC BLOOD PRESSURE: 151 MMHG | DIASTOLIC BLOOD PRESSURE: 69 MMHG

## 2019-11-10 VITALS — SYSTOLIC BLOOD PRESSURE: 155 MMHG | DIASTOLIC BLOOD PRESSURE: 81 MMHG

## 2019-11-10 VITALS — SYSTOLIC BLOOD PRESSURE: 153 MMHG | DIASTOLIC BLOOD PRESSURE: 89 MMHG

## 2019-11-10 VITALS — SYSTOLIC BLOOD PRESSURE: 148 MMHG | DIASTOLIC BLOOD PRESSURE: 76 MMHG

## 2019-11-10 VITALS — DIASTOLIC BLOOD PRESSURE: 74 MMHG | SYSTOLIC BLOOD PRESSURE: 153 MMHG

## 2019-11-10 VITALS — SYSTOLIC BLOOD PRESSURE: 154 MMHG | DIASTOLIC BLOOD PRESSURE: 85 MMHG

## 2019-11-10 VITALS — SYSTOLIC BLOOD PRESSURE: 147 MMHG | DIASTOLIC BLOOD PRESSURE: 82 MMHG

## 2019-11-10 VITALS — SYSTOLIC BLOOD PRESSURE: 167 MMHG | DIASTOLIC BLOOD PRESSURE: 81 MMHG

## 2019-11-10 VITALS — SYSTOLIC BLOOD PRESSURE: 164 MMHG | DIASTOLIC BLOOD PRESSURE: 81 MMHG

## 2019-11-10 VITALS — DIASTOLIC BLOOD PRESSURE: 82 MMHG | SYSTOLIC BLOOD PRESSURE: 159 MMHG

## 2019-11-10 LAB
BUN SERPL-MCNC: 16 MG/DL (ref 7–18)
CALCIUM SERPL-MCNC: 8.7 MG/DL (ref 8.8–10.2)
CHLORIDE SERPL-SCNC: 101 MEQ/L (ref 98–107)
CO2 SERPL-SCNC: 28 MEQ/L (ref 21–32)
CREAT SERPL-MCNC: 0.68 MG/DL (ref 0.7–1.3)
GFR SERPL CREATININE-BSD FRML MDRD: > 60 ML/MIN/{1.73_M2} (ref 49–?)
GLUCOSE SERPL-MCNC: 108 MG/DL (ref 70–100)
HCT VFR BLD AUTO: 38.4 % (ref 42–52)
HGB BLD-MCNC: 13.1 G/DL (ref 13.5–17.5)
MAGNESIUM SERPL-MCNC: 1.9 MG/DL (ref 1.8–2.4)
MCH RBC QN AUTO: 32.9 PG (ref 27–33)
MCHC RBC AUTO-ENTMCNC: 34.1 G/DL (ref 32–36.5)
MCV RBC AUTO: 96.5 FL (ref 80–96)
PLATELET # BLD AUTO: 222 10^3/UL (ref 150–450)
POTASSIUM SERPL-SCNC: 3.8 MEQ/L (ref 3.5–5.1)
RBC # BLD AUTO: 3.98 10^6/UL (ref 4.3–6.1)
SODIUM SERPL-SCNC: 134 MEQ/L (ref 136–145)
WBC # BLD AUTO: 5.4 10^3/UL (ref 4–10)

## 2019-11-10 RX ADMIN — ENOXAPARIN SODIUM SCH MG: 40 INJECTION SUBCUTANEOUS at 09:17

## 2019-11-10 RX ADMIN — BENAZEPRIL HYDROCHLORIDE SCH MG: 20 TABLET, COATED ORAL at 09:16

## 2019-11-10 RX ADMIN — FOLIC ACID SCH MG: 1 TABLET ORAL at 09:17

## 2019-11-10 RX ADMIN — METOPROLOL TARTRATE SCH MG: 25 TABLET, FILM COATED ORAL at 21:00

## 2019-11-10 RX ADMIN — MULTIPLE VITAMINS W/ MINERALS TAB SCH TAB: TAB at 09:17

## 2019-11-10 RX ADMIN — ASPIRIN SCH MG: 81 TABLET ORAL at 09:16

## 2019-11-10 RX ADMIN — Medication SCH MG: at 09:17

## 2019-11-10 RX ADMIN — LIDOCAINE SCH PATCH: 50 PATCH CUTANEOUS at 20:59

## 2019-11-10 RX ADMIN — IMMUNE GLOBULIN INFUSION (HUMAN) SCH MLS/HR: 100 INJECTION, SOLUTION INTRAVENOUS; SUBCUTANEOUS at 12:15

## 2019-11-10 RX ADMIN — ACETAMINOPHEN SCH MG: 650 TABLET, FILM COATED, EXTENDED RELEASE ORAL at 17:13

## 2019-11-10 RX ADMIN — VENLAFAXINE HYDROCHLORIDE SCH MG: 75 CAPSULE, EXTENDED RELEASE ORAL at 09:16

## 2019-11-10 RX ADMIN — ATORVASTATIN CALCIUM SCH MG: 10 TABLET, FILM COATED ORAL at 21:00

## 2019-11-10 RX ADMIN — METOPROLOL TARTRATE SCH MG: 25 TABLET, FILM COATED ORAL at 12:10

## 2019-11-10 RX ADMIN — CYANOCOBALAMIN TAB 500 MCG SCH MCG: 500 TAB at 09:17

## 2019-11-10 RX ADMIN — ACETAMINOPHEN SCH MG: 650 TABLET, FILM COATED, EXTENDED RELEASE ORAL at 06:07

## 2019-11-10 RX ADMIN — Medication SCH: at 09:00

## 2019-11-10 RX ADMIN — NICOTINE SCH PATCH: 14 PATCH, EXTENDED RELEASE TRANSDERMAL at 09:16

## 2019-11-10 RX ADMIN — MORPHINE SULFATE PRN MG: 2 INJECTION, SOLUTION INTRAMUSCULAR; INTRAVENOUS at 04:15

## 2019-11-10 RX ADMIN — BENAZEPRIL HYDROCHLORIDE SCH MG: 20 TABLET, COATED ORAL at 21:00

## 2019-11-10 RX ADMIN — MORPHINE SULFATE PRN MG: 2 INJECTION, SOLUTION INTRAMUSCULAR; INTRAVENOUS at 01:06

## 2019-11-10 NOTE — IPNPDOC
Text Note


Date of Service


The patient was seen on 11/10/19.





NOTE


Subjective: No any acute events overnight. Continues to have profound weakness 

of his legs, he cannot elevate legs against gravity and he is unable to walk. 

Continues to have the double vision. Also patient developed decreased 

flexibility of his fingers of both hands, which is worsened today


Continues to have weakness of both arms which was not present on the admission, 

patient can't abduct arms more than 90





Objective:VITAL SIGNS:  Please see below.


GENERAL APPEARANCE: Well-nourished, well-developed, not in apparent distress


HEENT: Normocephalic, atraumatic. Mucous members moist and pink


CARDIOVASCULAR: Regular rate and rhythm. No murmurs, rubs or gallops. Radial pu

lses are intact. There is no lower extremity edema


LUNGS: . Diminished lung sounds bilaterally


ABDOMEN: Bowel sounds are hypoactive. Abdomen is soft and nontender. 


MUSCULOSKELETAL: Patient cannot elevate his legs against gravity, there is 

decreased strength of the left arm 3 out of 5, right arm for about 5


NEUROLOGICAL: Patient has double vision on both eyes, no nystagmus, no 

peripheral vision limitations. Sensitivity of the legs preserved, no nuchal 

rigidity. Alert, oriented, awake. Speech is not dysarthric





Assessment and plan:


Patient is 64 years old male with past history of hypertension, prostate cancer 

treated with prostatectomy in 2007 presented hospital with bilateral leg 

weakness and double vision. Imaging study showed multiple spinal bone metastasis

and right lung nodules.








Bilateral leg weakness/ arms weakness


On 11/8/19 MRI of the spine did not show compression of the spinal cord


Differential diagnosis includes paraneoplastic syndrome secondary to malignancy 

S Lamberta Eaton syndrome, myasthenia gravis or Gullian Weeks syndrome. I 

discussed findings with neurologist Dr. Jimenez , he recommended IVIG infusion for 

possible Guillain-Chariton syndrome.  CSF showed elevated total protein with normal

cells.  This can be suggestive of GBS which would also explain his hyporeflexia 

throughout and weakness


On physical exam 11/9/19 patient presented with increased weakness of both arms.

No any difficulties in breathing so far


Oncologist Dr. Lopez follows pt 


Continue to monitor  neuro check, pulmonary function


Await ab to voltage gated calcium channel, acetylcholine receptor Ab


vitamin B12 was 256, will check intrinsic factor


I ordered intramuscular injection of B12 


TSH 4.01 





Bone metastasis/Lung nodules


MRI of the spine showed multiple metastasis on the thoracic and lumbar spine 

level


Chest CAT scan showed right lung nodules


Patient had a history of prostate cancer, however PSA negative, his also active 

smoker. Unclear the primary source of cancer. We will proceed with biopsy. IR 

guided biopsy ordered


Await CSF cytology





Chronic HTN


Continue cardioprotective medication





Tobacco Abuse


Plan: nicotine patch / smoking cessation education





ETOH abuse


CIWA 





hypertension


I increased the dose of amlodipine to 10 mg


The dose of benazepril to 20 mg twice a day and added metoprolol 25 mg twice a 

day


Continue to monitor 





B12 deficiency


Most likely secondary to alcoholism


We'll check intrinsic factor to r/o pernicious anemia


B12 IM





VS,Fishbone, I+O


VS, Fishbone, I+O


Laboratory Tests


11/10/19 04:50











Vital Signs








  Date Time  Temp Pulse Resp B/P (MAP) Pulse Ox O2 Delivery O2 Flow Rate FiO2


 


11/10/19 09:16    148/76    


 


11/10/19 06:00  78      


 


11/10/19 06:00 99.8  16  96 Nasal Cannula 2.0 














I&O- Last 24 Hours up to 6 AM 


 


 11/10/19





 06:00


 


Intake Total 1170 ml


 


Output Total 1250 ml


 


Balance -80 ml

















DON MANRIQEU DO            Nov 10, 2019 10:36

## 2019-11-11 VITALS — DIASTOLIC BLOOD PRESSURE: 81 MMHG | SYSTOLIC BLOOD PRESSURE: 171 MMHG

## 2019-11-11 VITALS — SYSTOLIC BLOOD PRESSURE: 158 MMHG | OXYGEN SATURATION: 90 % | DIASTOLIC BLOOD PRESSURE: 80 MMHG

## 2019-11-11 VITALS — SYSTOLIC BLOOD PRESSURE: 157 MMHG | DIASTOLIC BLOOD PRESSURE: 76 MMHG

## 2019-11-11 VITALS — DIASTOLIC BLOOD PRESSURE: 88 MMHG | SYSTOLIC BLOOD PRESSURE: 156 MMHG

## 2019-11-11 VITALS — DIASTOLIC BLOOD PRESSURE: 81 MMHG | OXYGEN SATURATION: 89 % | SYSTOLIC BLOOD PRESSURE: 153 MMHG

## 2019-11-11 VITALS — SYSTOLIC BLOOD PRESSURE: 140 MMHG | DIASTOLIC BLOOD PRESSURE: 79 MMHG

## 2019-11-11 VITALS — OXYGEN SATURATION: 90 % | DIASTOLIC BLOOD PRESSURE: 91 MMHG | SYSTOLIC BLOOD PRESSURE: 181 MMHG

## 2019-11-11 VITALS — SYSTOLIC BLOOD PRESSURE: 139 MMHG | DIASTOLIC BLOOD PRESSURE: 59 MMHG

## 2019-11-11 VITALS — OXYGEN SATURATION: 90 % | DIASTOLIC BLOOD PRESSURE: 80 MMHG | SYSTOLIC BLOOD PRESSURE: 163 MMHG

## 2019-11-11 VITALS — DIASTOLIC BLOOD PRESSURE: 80 MMHG | SYSTOLIC BLOOD PRESSURE: 162 MMHG

## 2019-11-11 VITALS — SYSTOLIC BLOOD PRESSURE: 173 MMHG | DIASTOLIC BLOOD PRESSURE: 88 MMHG

## 2019-11-11 VITALS — DIASTOLIC BLOOD PRESSURE: 80 MMHG | SYSTOLIC BLOOD PRESSURE: 156 MMHG

## 2019-11-11 VITALS — DIASTOLIC BLOOD PRESSURE: 88 MMHG | SYSTOLIC BLOOD PRESSURE: 170 MMHG

## 2019-11-11 VITALS — DIASTOLIC BLOOD PRESSURE: 72 MMHG | SYSTOLIC BLOOD PRESSURE: 148 MMHG

## 2019-11-11 VITALS — DIASTOLIC BLOOD PRESSURE: 82 MMHG | SYSTOLIC BLOOD PRESSURE: 171 MMHG

## 2019-11-11 VITALS — SYSTOLIC BLOOD PRESSURE: 170 MMHG | DIASTOLIC BLOOD PRESSURE: 88 MMHG

## 2019-11-11 VITALS — SYSTOLIC BLOOD PRESSURE: 167 MMHG | DIASTOLIC BLOOD PRESSURE: 91 MMHG

## 2019-11-11 VITALS — DIASTOLIC BLOOD PRESSURE: 90 MMHG | SYSTOLIC BLOOD PRESSURE: 156 MMHG

## 2019-11-11 LAB
BUN SERPL-MCNC: 19 MG/DL (ref 7–18)
CALCIUM SERPL-MCNC: 9 MG/DL (ref 8.8–10.2)
CHLORIDE SERPL-SCNC: 101 MEQ/L (ref 98–107)
CO2 SERPL-SCNC: 28 MEQ/L (ref 21–32)
CREAT SERPL-MCNC: 0.69 MG/DL (ref 0.7–1.3)
GFR SERPL CREATININE-BSD FRML MDRD: > 60 ML/MIN/{1.73_M2} (ref 49–?)
GLUCOSE SERPL-MCNC: 112 MG/DL (ref 70–100)
MAGNESIUM SERPL-MCNC: 2.2 MG/DL (ref 1.8–2.4)
POTASSIUM SERPL-SCNC: 4.2 MEQ/L (ref 3.5–5.1)
SODIUM SERPL-SCNC: 134 MEQ/L (ref 136–145)

## 2019-11-11 PROCEDURE — 0B9K3ZX DRAINAGE OF RIGHT LUNG, PERCUTANEOUS APPROACH, DIAGNOSTIC: ICD-10-PCS | Performed by: RADIOLOGY

## 2019-11-11 RX ADMIN — Medication SCH: at 09:00

## 2019-11-11 RX ADMIN — ATORVASTATIN CALCIUM SCH MG: 10 TABLET, FILM COATED ORAL at 21:12

## 2019-11-11 RX ADMIN — IMMUNE GLOBULIN INFUSION (HUMAN) SCH MLS/HR: 100 INJECTION, SOLUTION INTRAVENOUS; SUBCUTANEOUS at 14:04

## 2019-11-11 RX ADMIN — BENAZEPRIL HYDROCHLORIDE SCH MG: 20 TABLET, COATED ORAL at 21:12

## 2019-11-11 RX ADMIN — METOPROLOL TARTRATE SCH MG: 25 TABLET, FILM COATED ORAL at 21:12

## 2019-11-11 RX ADMIN — ENOXAPARIN SODIUM SCH MG: 40 INJECTION SUBCUTANEOUS at 12:10

## 2019-11-11 RX ADMIN — HYDRALAZINE HYDROCHLORIDE SCH MG: 25 TABLET, FILM COATED ORAL at 14:02

## 2019-11-11 RX ADMIN — FOLIC ACID SCH MG: 1 TABLET ORAL at 09:28

## 2019-11-11 RX ADMIN — NICOTINE SCH PATCH: 14 PATCH, EXTENDED RELEASE TRANSDERMAL at 09:29

## 2019-11-11 RX ADMIN — MORPHINE SULFATE PRN MG: 2 INJECTION, SOLUTION INTRAMUSCULAR; INTRAVENOUS at 00:46

## 2019-11-11 RX ADMIN — VENLAFAXINE HYDROCHLORIDE SCH MG: 75 CAPSULE, EXTENDED RELEASE ORAL at 09:29

## 2019-11-11 RX ADMIN — ASPIRIN SCH MG: 81 TABLET ORAL at 09:28

## 2019-11-11 RX ADMIN — CYANOCOBALAMIN TAB 500 MCG SCH MCG: 500 TAB at 09:28

## 2019-11-11 RX ADMIN — ACETAMINOPHEN SCH MG: 650 TABLET, FILM COATED, EXTENDED RELEASE ORAL at 05:39

## 2019-11-11 RX ADMIN — BENAZEPRIL HYDROCHLORIDE SCH MG: 20 TABLET, COATED ORAL at 09:29

## 2019-11-11 RX ADMIN — MORPHINE SULFATE PRN MG: 2 INJECTION, SOLUTION INTRAMUSCULAR; INTRAVENOUS at 23:49

## 2019-11-11 RX ADMIN — LIDOCAINE SCH PATCH: 50 PATCH CUTANEOUS at 21:00

## 2019-11-11 RX ADMIN — HYDRALAZINE HYDROCHLORIDE SCH MG: 25 TABLET, FILM COATED ORAL at 22:41

## 2019-11-11 RX ADMIN — ACETAMINOPHEN SCH MG: 650 TABLET, FILM COATED, EXTENDED RELEASE ORAL at 17:23

## 2019-11-11 RX ADMIN — MULTIPLE VITAMINS W/ MINERALS TAB SCH TAB: TAB at 09:28

## 2019-11-11 RX ADMIN — METOPROLOL TARTRATE SCH MG: 25 TABLET, FILM COATED ORAL at 09:28

## 2019-11-11 NOTE — REP
AP chest x-ray:  Single sitting AP view.

 

History:  Status post right lung biopsy.

 

Comparison chest x-ray September 9, 2016.

 

Findings:  There is no visible pneumothorax.  No hydrothorax is seen.

Ill-defined somewhat nodular opacity is seen at the right base laterally.

 

Impression:

 

No complication is identified.

 

 

Electronically Signed by

Delroy Felix MD 11/11/2019 12:03 P

## 2019-11-11 NOTE — REP
Single view chest:  11/11/2019.

 

Indication:  Status post lung biopsy.

 

Comparison:  Earlier today.

 

Findings:

 

There is no pneumothorax.  The right lung opacity corresponding to the biopsied

mass is stable.  There is no significant pleural effusion or pneumo/hemothorax.

Chronic rib deformities on the right are redemonstrated.  Cardiac silhouette is

not enlarged.

 

Impression:

 

Expected post procedural findings.  No pneumothorax.

 

 

Electronically Signed by

Umesh Thompson DO 11/11/2019 02:00 P

## 2019-11-11 NOTE — IPNPDOC
Date Seen


The patient was seen on 11/11/19.





Progress Note


SUBJECTIVE: 64-year-old male with past medical history of hypertension, 

hyperlipidemia and prostate cancer status post prostatectomy, was admitted for 

progressive weakness, concern for Guillain-Barr syndrome, being treated with 

IVIG. Imaging shows multiple lung nodules with multiple metastatic bone lesions.

PSA has been negative, patient has strong history of smoking. Patient reports 

improvement in strength of his arms over the past couple of days, no changes to 

his lower extremity at this time. He also reports double vision, slurred speech,

denies any issues with breathing. Patient is scheduled for IR lung biopsy later 

today. Patient denies any shortness of breath, chest pain, nausea, vomiting, 

abdominal pain or diarrhea.





10 point review of system is negative except for above





PHYSICAL EXAMINATION:


VITAL SIGNS: Please see below.


GENERAL: No distress


HEENT: Normocephalic, atraumatic, moist mucous membranes


NECK: Supple


CARDIOVASCULAR EXAMINATION: S1, S2, no murmurs


RESPIRATORY EXAMINATION: Clear to auscultation, no wheezing


ABDOMINAL EXAMINATION: Soft, nontender, nondistended, positive bowel sounds


EXTREMITIES: Unable to move lower extremities


SKIN: No rash


NEUROLOGICAL EXAMINATION: Alert and oriented 3, bilateral upper extremities 4 

out of 5, bilateral lower extremities 0 out of 5, no sensory deficits 


PSYCHIATRIC EXAMINATION: Calm and cooperative





LABORATORY DATA, IMAGING STUDIES, MICROBIOLOGY: Please see below.





DVT prophylaxis ordered?: Yes





ASSESSMENT AND PLAN: 64-year-old male with past medical history prostate cancer,

hypertension, hypothyroidism, admitted for progressive weakness, concerning for 

Guillain-Barr syndrome.





PROBLEMS:


1. Progressive weakness: Involving bilateral lower and upper extremities, no 

sensory deficits, concerning for Guillain-Barr syndrome, LP with elevated 

protein, imaging with metastatic pulmonary and bone lesions, getting IVIG 

infusions, last dose today. Patient is scheduled for CT-guided IR lung biopsy 

later today. Neurology evaluation appreciated





2. Hypertension:. Home meds with hold parameters. BP consistently elevated, 

we'll add hydralazine to his regimen.





3. Hypothyroidism: Continue levothyroxine





DVT prophylaxis: Lovenox.


GI prophylaxis: Not needed





VS, I&O, 24H, Fishbone


Vital Signs/I&O





Vital Signs








  Date Time  Temp Pulse Resp B/P (MAP) Pulse Ox O2 Delivery O2 Flow Rate FiO2


 


11/11/19 12:06 98.6 62 20 171/82 (111) 92 Room Air  


 


11/11/19 11:47       2 














I&O- Last 24 Hours up to 6 AM 


 


 11/11/19





 06:00


 


Intake Total 1150 ml


 


Output Total 1200 ml


 


Balance -50 ml











Laboratory Data


24H LABS


Laboratory Tests 2


11/10/19 17:37: 


Urine Color YELLOW, Urine Appearance HAZY, Urine pH 6.0, Urine Specific Gravity 

1.021, Urine Protein NEGATIVE, Urine Glucose (UA) NEGATIVE, Urine Ketones 1+H, 

Urine Blood NEGATIVE, Urine Nitrite NEGATIVE, Urine Bilirubin NEGATIVE, Urine 

Urobilinogen 0.2, Urine Leukocyte Esterase NEGATIVE, Urine WBC (Auto) 1, Urine 

RBC (Auto) 2, Urine Hyaline Casts (Auto) 0, Urine Bacteria (Auto) NEGATIVE, 

Urine Squamous Epithelial Cells 0, Urine Amorphous Sediment SMALLH, Urine Mucus 

(Auto) SMALL, Urine Sperm (Auto) 


11/11/19 04:38: 


Anion Gap 5L, Glomerular Filtration Rate > 60.0, Calcium Level 9.0, Magnesium 

Level 2.2


CBC/BMP


Laboratory Tests


11/11/19 04:38








Microbiology





Microbiology


11/7/19 Gram Stain - Final, Complete


          


11/7/19 CSF Culture - Final, Complete











GONDAL,KHUBAIB N. MD           Nov 11, 2019 12:40

## 2019-11-11 NOTE — REP
CT-GUIDED LEFT UPPER LOBE LUNG BIOPSY

 

The procedure was performed under the direct supervision of Dr. Felix.

 

The patient has a history of multiple right lung nodule seen on a previous CT

scan dated 11/08/2019.

 

The risks and benefits of the procedure were explained to the patient and

informed consent was obtained.

 

A lesion in the right lung along the fissure was localized using CT guidance.

The skin was prepped and draped in a sterile fashion.  1% lidocaine was used as a

local anesthetic.  Using CT guidance a 19/20 gauge coaxial needle biopsy system

was inserted and advanced into the nodule.  Four core biopsy samples were

obtained and sent to lab. The patient did develop a very small right

pneumothorax.  This was not seen on either postbiopsy follow-up chest x-ray.

 

The patient tolerated the procedure well and there were no immediate

complications.  After the appropriate amount of monitored convalescence the

patient was discharged from the department.

 

 

 

 

Electronically Signed by

SAUL Ramirez 11/11/2019 05:11 P

Electronically Signed by

Delroy Felix MD 11/11/2019 05:15 P

## 2019-11-12 VITALS — SYSTOLIC BLOOD PRESSURE: 166 MMHG | OXYGEN SATURATION: 91 % | DIASTOLIC BLOOD PRESSURE: 74 MMHG

## 2019-11-12 VITALS — SYSTOLIC BLOOD PRESSURE: 161 MMHG | DIASTOLIC BLOOD PRESSURE: 74 MMHG | OXYGEN SATURATION: 92 %

## 2019-11-12 VITALS — SYSTOLIC BLOOD PRESSURE: 153 MMHG | OXYGEN SATURATION: 92 % | DIASTOLIC BLOOD PRESSURE: 73 MMHG

## 2019-11-12 VITALS — SYSTOLIC BLOOD PRESSURE: 184 MMHG | OXYGEN SATURATION: 92 % | DIASTOLIC BLOOD PRESSURE: 83 MMHG

## 2019-11-12 VITALS — OXYGEN SATURATION: 90 % | DIASTOLIC BLOOD PRESSURE: 77 MMHG | SYSTOLIC BLOOD PRESSURE: 163 MMHG

## 2019-11-12 VITALS — SYSTOLIC BLOOD PRESSURE: 178 MMHG | DIASTOLIC BLOOD PRESSURE: 82 MMHG

## 2019-11-12 VITALS — DIASTOLIC BLOOD PRESSURE: 108 MMHG | SYSTOLIC BLOOD PRESSURE: 161 MMHG

## 2019-11-12 VITALS — SYSTOLIC BLOOD PRESSURE: 159 MMHG | OXYGEN SATURATION: 91 % | DIASTOLIC BLOOD PRESSURE: 101 MMHG

## 2019-11-12 VITALS — SYSTOLIC BLOOD PRESSURE: 163 MMHG | DIASTOLIC BLOOD PRESSURE: 84 MMHG

## 2019-11-12 VITALS — OXYGEN SATURATION: 94 % | DIASTOLIC BLOOD PRESSURE: 100 MMHG | SYSTOLIC BLOOD PRESSURE: 175 MMHG

## 2019-11-12 VITALS — DIASTOLIC BLOOD PRESSURE: 89 MMHG | SYSTOLIC BLOOD PRESSURE: 170 MMHG

## 2019-11-12 VITALS — SYSTOLIC BLOOD PRESSURE: 157 MMHG | OXYGEN SATURATION: 93 % | DIASTOLIC BLOOD PRESSURE: 75 MMHG

## 2019-11-12 VITALS — DIASTOLIC BLOOD PRESSURE: 76 MMHG | SYSTOLIC BLOOD PRESSURE: 160 MMHG

## 2019-11-12 VITALS — SYSTOLIC BLOOD PRESSURE: 170 MMHG | DIASTOLIC BLOOD PRESSURE: 89 MMHG

## 2019-11-12 LAB
BUN SERPL-MCNC: 27 MG/DL (ref 7–18)
CALCIUM SERPL-MCNC: 9.3 MG/DL (ref 8.8–10.2)
CHLORIDE SERPL-SCNC: 100 MEQ/L (ref 98–107)
CO2 SERPL-SCNC: 28 MEQ/L (ref 21–32)
CREAT SERPL-MCNC: 0.68 MG/DL (ref 0.7–1.3)
GFR SERPL CREATININE-BSD FRML MDRD: > 60 ML/MIN/{1.73_M2} (ref 49–?)
GLUCOSE SERPL-MCNC: 105 MG/DL (ref 70–100)
MAGNESIUM SERPL-MCNC: 2.3 MG/DL (ref 1.8–2.4)
PHOSPHATE SERPL-MCNC: 3.7 MG/DL (ref 2.5–4.9)
POTASSIUM SERPL-SCNC: 3.9 MEQ/L (ref 3.5–5.1)
SODIUM SERPL-SCNC: 134 MEQ/L (ref 136–145)

## 2019-11-12 RX ADMIN — HYDRALAZINE HYDROCHLORIDE SCH MG: 25 TABLET, FILM COATED ORAL at 13:26

## 2019-11-12 RX ADMIN — BENAZEPRIL HYDROCHLORIDE SCH MG: 20 TABLET, COATED ORAL at 20:04

## 2019-11-12 RX ADMIN — NICOTINE SCH PATCH: 14 PATCH, EXTENDED RELEASE TRANSDERMAL at 08:58

## 2019-11-12 RX ADMIN — MORPHINE SULFATE PRN MG: 2 INJECTION, SOLUTION INTRAMUSCULAR; INTRAVENOUS at 13:26

## 2019-11-12 RX ADMIN — METOPROLOL TARTRATE SCH MG: 25 TABLET, FILM COATED ORAL at 20:05

## 2019-11-12 RX ADMIN — MULTIPLE VITAMINS W/ MINERALS TAB SCH TAB: TAB at 08:59

## 2019-11-12 RX ADMIN — ATORVASTATIN CALCIUM SCH MG: 10 TABLET, FILM COATED ORAL at 20:05

## 2019-11-12 RX ADMIN — ACETAMINOPHEN SCH MG: 650 TABLET, FILM COATED, EXTENDED RELEASE ORAL at 17:17

## 2019-11-12 RX ADMIN — BENAZEPRIL HYDROCHLORIDE SCH MG: 20 TABLET, COATED ORAL at 09:00

## 2019-11-12 RX ADMIN — HYDRALAZINE HYDROCHLORIDE SCH MG: 25 TABLET, FILM COATED ORAL at 22:36

## 2019-11-12 RX ADMIN — VENLAFAXINE HYDROCHLORIDE SCH MG: 75 CAPSULE, EXTENDED RELEASE ORAL at 09:00

## 2019-11-12 RX ADMIN — CYANOCOBALAMIN TAB 500 MCG SCH MCG: 500 TAB at 08:59

## 2019-11-12 RX ADMIN — MORPHINE SULFATE PRN MG: 2 INJECTION, SOLUTION INTRAMUSCULAR; INTRAVENOUS at 20:10

## 2019-11-12 RX ADMIN — HYDRALAZINE HYDROCHLORIDE SCH MG: 25 TABLET, FILM COATED ORAL at 05:15

## 2019-11-12 RX ADMIN — MORPHINE SULFATE PRN MG: 2 INJECTION, SOLUTION INTRAMUSCULAR; INTRAVENOUS at 01:49

## 2019-11-12 RX ADMIN — Medication SCH: at 09:15

## 2019-11-12 RX ADMIN — FOLIC ACID SCH MG: 1 TABLET ORAL at 08:59

## 2019-11-12 RX ADMIN — METOPROLOL TARTRATE SCH MG: 25 TABLET, FILM COATED ORAL at 08:59

## 2019-11-12 RX ADMIN — MORPHINE SULFATE PRN MG: 2 INJECTION, SOLUTION INTRAMUSCULAR; INTRAVENOUS at 05:14

## 2019-11-12 RX ADMIN — ACETAMINOPHEN SCH MG: 650 TABLET, FILM COATED, EXTENDED RELEASE ORAL at 05:14

## 2019-11-12 RX ADMIN — LIDOCAINE SCH PATCH: 50 PATCH CUTANEOUS at 20:06

## 2019-11-12 RX ADMIN — ENOXAPARIN SODIUM SCH MG: 40 INJECTION SUBCUTANEOUS at 09:00

## 2019-11-12 RX ADMIN — ASPIRIN SCH MG: 81 TABLET ORAL at 09:00

## 2019-11-12 NOTE — REP
MRI cervical spine:  11/12/2019.

 

Indication:  Quadriplegia.

 

Comparison:  None.

 

Technique:  Multiplanar short and long TR sequences of the cervical spine were

obtained including IV Gadolinium images.  15 ml IV ProHance were administered.

 

Findings:

 

Diffusely heterogeneous marrow signal is present most pronounced within C6 with

corresponding heterogeneous pathologic gadolinium enhancement.  No pathologic

compression fracture is present.  There are no areas of severe spinal canal

narrowing/compression of the cord.  No abnormal cord signal or pathologic cord

gadolinium enhancement are detected.  Multilevel spondylosis is present most

pronounced at C3/C4 and C4/C5 with mild spinal canal narrowing.  There is minimal

retrolisthesis of C4 on C5 as well as minimal anterolisthesis of C7 on T1.

 

Impression:

 

Diffuse osseous metastases of the cervical spine without severe spinal canal

narrowing/cord compression.

 

 

Electronically Signed by

Umesh Thompson DO 11/12/2019 01:19 P

## 2019-11-12 NOTE — IPNPDOC
Date Seen


The patient was seen on 11/12/19.





Progress Note


SUBJECTIVE: 64-year-old male with past medical history of hypertension, 

hyperlipidemia and prostate cancer status post prostatectomy, was admitted for 

progressive weakness, concern for Guillain-Barr syndrome, being treated with 

IVIG. Imaging shows multiple lung nodules with multiple metastatic bone lesions.

PSA has been negative, patient has strong history of smoking. Patient reports 

improvement in strength of his arms over the past couple of days, no changes to 

his lower extremity at this time. He also reports double vision, slurred speech,

denies any issues with breathing. Patient is scheduled for IR lung biopsy later 

today. Patient denies any shortness of breath, chest pain, nausea, vomiting, 

abdominal pain or diarrhea.





11/12/2019


Patient comfortable, without any new complaints, continues to have weakness, 

working with physical therapy.





10 point review of system is negative except for above





PHYSICAL EXAMINATION:


VITAL SIGNS: Please see below.


GENERAL: No distress


HEENT: Normocephalic, atraumatic, moist mucous membranes


NECK: Supple


CARDIOVASCULAR EXAMINATION: S1, S2, no murmurs


RESPIRATORY EXAMINATION: Clear to auscultation, no wheezing


ABDOMINAL EXAMINATION: Soft, nontender, nondistended, positive bowel sounds


EXTREMITIES: Unable to move lower extremities


SKIN: No rash


NEUROLOGICAL EXAMINATION: Alert and oriented 3, bilateral upper extremities 4 

out of 5, bilateral lower extremities 0 out of 5, no sensory deficits 


PSYCHIATRIC EXAMINATION: Calm and cooperative





LABORATORY DATA, IMAGING STUDIES, MICROBIOLOGY: Please see below.





DVT prophylaxis ordered?: Yes





ASSESSMENT AND PLAN: 64-year-old male with past medical history prostate cancer,

hypertension, hypothyroidism, admitted for progressive weakness, concerning for 

Guillain-Barr syndrome.





PROBLEMS:


1. Progressive weakness: Involving bilateral lower and upper extremities, no 

sensory deficits, possibly paraneoplastic syndrome, LP with elevated protein, 

imaging with multiple pulmonary and bone lesions, status post IVIG infusions. 

Patient underwent CT-guided lung biopsy, preliminary path suggestive of non-

small cell lung cancer, favoring adenocarcinoma (Lung primary). There is no 

oncology service available for inpatient consuls today, will attempt tomorrow. 

Neurology evaluation appreciated





2. Hypertension: Continue hydralazine in addition to home meds.





3. Hypothyroidism: Continue levothyroxine





DVT prophylaxis: Lovenox.


GI prophylaxis: Not needed





VS, I&O, 24H, Fishbone


Vital Signs/I&O





Vital Signs








  Date Time  Temp Pulse Resp B/P (MAP) Pulse Ox O2 Delivery O2 Flow Rate FiO2


 


11/12/19 14:00  67  170/89    


 


11/12/19 13:36   18   Room Air  


 


11/12/19 13:24 98.5    91   


 


11/12/19 07:51       1.0 














I&O- Last 24 Hours up to 6 AM 


 


 11/12/19





 06:00


 


Intake Total 1060 ml


 


Output Total 1075 ml


 


Balance -15 ml











Laboratory Data


24H LABS


Laboratory Tests 2


11/12/19 09:42: 


Anion Gap 6L, Glomerular Filtration Rate > 60.0, Calcium Level 9.3, Phosphorus 

Level 3.7, Magnesium Level 2.3


CBC/BMP


Laboratory Tests


11/12/19 09:42








Microbiology





Microbiology


11/7/19 Gram Stain - Final, Complete


          


11/7/19 CSF Culture - Final, Complete











GONDAL,KHUBAIB N. MD           Nov 12, 2019 14:14

## 2019-11-13 VITALS — DIASTOLIC BLOOD PRESSURE: 109 MMHG | SYSTOLIC BLOOD PRESSURE: 174 MMHG

## 2019-11-13 VITALS — DIASTOLIC BLOOD PRESSURE: 70 MMHG | SYSTOLIC BLOOD PRESSURE: 110 MMHG

## 2019-11-13 VITALS — SYSTOLIC BLOOD PRESSURE: 90 MMHG | DIASTOLIC BLOOD PRESSURE: 57 MMHG

## 2019-11-13 VITALS — DIASTOLIC BLOOD PRESSURE: 73 MMHG | SYSTOLIC BLOOD PRESSURE: 118 MMHG

## 2019-11-13 VITALS — DIASTOLIC BLOOD PRESSURE: 73 MMHG | SYSTOLIC BLOOD PRESSURE: 129 MMHG

## 2019-11-13 VITALS — SYSTOLIC BLOOD PRESSURE: 92 MMHG | DIASTOLIC BLOOD PRESSURE: 54 MMHG

## 2019-11-13 VITALS — SYSTOLIC BLOOD PRESSURE: 130 MMHG | DIASTOLIC BLOOD PRESSURE: 78 MMHG

## 2019-11-13 VITALS — SYSTOLIC BLOOD PRESSURE: 110 MMHG | DIASTOLIC BLOOD PRESSURE: 61 MMHG

## 2019-11-13 VITALS — SYSTOLIC BLOOD PRESSURE: 94 MMHG | DIASTOLIC BLOOD PRESSURE: 56 MMHG

## 2019-11-13 VITALS — DIASTOLIC BLOOD PRESSURE: 95 MMHG | SYSTOLIC BLOOD PRESSURE: 151 MMHG

## 2019-11-13 VITALS — OXYGEN SATURATION: 97 %

## 2019-11-13 VITALS — DIASTOLIC BLOOD PRESSURE: 53 MMHG | SYSTOLIC BLOOD PRESSURE: 81 MMHG

## 2019-11-13 VITALS — SYSTOLIC BLOOD PRESSURE: 139 MMHG | DIASTOLIC BLOOD PRESSURE: 88 MMHG

## 2019-11-13 VITALS — SYSTOLIC BLOOD PRESSURE: 83 MMHG | DIASTOLIC BLOOD PRESSURE: 53 MMHG

## 2019-11-13 VITALS — SYSTOLIC BLOOD PRESSURE: 91 MMHG | DIASTOLIC BLOOD PRESSURE: 55 MMHG

## 2019-11-13 VITALS — SYSTOLIC BLOOD PRESSURE: 93 MMHG | DIASTOLIC BLOOD PRESSURE: 55 MMHG

## 2019-11-13 VITALS — SYSTOLIC BLOOD PRESSURE: 141 MMHG | DIASTOLIC BLOOD PRESSURE: 78 MMHG

## 2019-11-13 VITALS — DIASTOLIC BLOOD PRESSURE: 78 MMHG | SYSTOLIC BLOOD PRESSURE: 130 MMHG

## 2019-11-13 VITALS — DIASTOLIC BLOOD PRESSURE: 61 MMHG | SYSTOLIC BLOOD PRESSURE: 106 MMHG

## 2019-11-13 VITALS — SYSTOLIC BLOOD PRESSURE: 119 MMHG | DIASTOLIC BLOOD PRESSURE: 68 MMHG

## 2019-11-13 VITALS — DIASTOLIC BLOOD PRESSURE: 70 MMHG | SYSTOLIC BLOOD PRESSURE: 115 MMHG

## 2019-11-13 VITALS — SYSTOLIC BLOOD PRESSURE: 93 MMHG | DIASTOLIC BLOOD PRESSURE: 58 MMHG

## 2019-11-13 VITALS — SYSTOLIC BLOOD PRESSURE: 101 MMHG | DIASTOLIC BLOOD PRESSURE: 63 MMHG

## 2019-11-13 LAB
BASE EXCESS BLDA CALC-SCNC: 3.6 MMOL/L (ref -2–2)
BASE EXCESS BLDA CALC-SCNC: 5.4 MMOL/L (ref -2–2)
BUN SERPL-MCNC: 27 MG/DL (ref 7–18)
CALCIUM SERPL-MCNC: 9.2 MG/DL (ref 8.8–10.2)
CHLORIDE SERPL-SCNC: 104 MEQ/L (ref 98–107)
CO2 BLDA CALC-SCNC: 29.8 MEQ/L (ref 23–31)
CO2 BLDA CALC-SCNC: 30.3 MEQ/L (ref 23–31)
CO2 SERPL-SCNC: 29 MEQ/L (ref 21–32)
CREAT SERPL-MCNC: 0.67 MG/DL (ref 0.7–1.3)
GFR SERPL CREATININE-BSD FRML MDRD: > 60 ML/MIN/{1.73_M2} (ref 49–?)
GLUCOSE SERPL-MCNC: 108 MG/DL (ref 70–100)
HCO3 BLDA-SCNC: 28.4 MEQ/L (ref 22–26)
HCO3 BLDA-SCNC: 29.1 MEQ/L (ref 22–26)
HCO3 STD BLDA-SCNC: 27.5 MEQ/L (ref 22–26)
HCO3 STD BLDA-SCNC: 29.3 MEQ/L (ref 22–26)
HCT VFR BLD AUTO: 45 % (ref 42–52)
HGB BLD-MCNC: 14.9 G/DL (ref 13.5–17.5)
MAGNESIUM SERPL-MCNC: 2 MG/DL (ref 1.8–2.4)
MCH RBC QN AUTO: 32.4 PG (ref 27–33)
MCHC RBC AUTO-ENTMCNC: 33.1 G/DL (ref 32–36.5)
MCV RBC AUTO: 97.8 FL (ref 80–96)
PCO2 BLDA: 39.2 MMHG (ref 35–45)
PCO2 BLDA: 43.6 MMHG (ref 35–45)
PH BLDA: 7.43 UNITS (ref 7.35–7.45)
PH BLDA: 7.49 UNITS (ref 7.35–7.45)
PLATELET # BLD AUTO: 264 10^3/UL (ref 150–450)
PO2 BLDA: 69.3 MMHG (ref 75–100)
PO2 BLDA: 79.7 MMHG (ref 75–100)
POTASSIUM SERPL-SCNC: 3.8 MEQ/L (ref 3.5–5.1)
RBC # BLD AUTO: 4.6 10^6/UL (ref 4.3–6.1)
SAO2 % BLDA: 93.6 % (ref 95–99)
SAO2 % BLDA: 95.1 % (ref 95–99)
SODIUM SERPL-SCNC: 137 MEQ/L (ref 136–145)
WBC # BLD AUTO: 8.4 10^3/UL (ref 4–10)

## 2019-11-13 PROCEDURE — 0BH17EZ INSERTION OF ENDOTRACHEAL AIRWAY INTO TRACHEA, VIA NATURAL OR ARTIFICIAL OPENING: ICD-10-PCS | Performed by: INTERNAL MEDICINE

## 2019-11-13 PROCEDURE — 5A1935Z RESPIRATORY VENTILATION, LESS THAN 24 CONSECUTIVE HOURS: ICD-10-PCS | Performed by: INTERNAL MEDICINE

## 2019-11-13 RX ADMIN — Medication SCH MLS/HR: at 15:46

## 2019-11-13 RX ADMIN — ASPIRIN SCH MG: 81 TABLET ORAL at 09:00

## 2019-11-13 RX ADMIN — NICOTINE SCH PATCH: 14 PATCH, EXTENDED RELEASE TRANSDERMAL at 11:13

## 2019-11-13 RX ADMIN — FOLIC ACID SCH MG: 1 TABLET ORAL at 11:07

## 2019-11-13 RX ADMIN — VENLAFAXINE HYDROCHLORIDE SCH MG: 75 CAPSULE, EXTENDED RELEASE ORAL at 09:00

## 2019-11-13 RX ADMIN — ACETAMINOPHEN SCH MG: 650 TABLET, FILM COATED, EXTENDED RELEASE ORAL at 06:46

## 2019-11-13 RX ADMIN — METOPROLOL TARTRATE SCH MG: 25 TABLET, FILM COATED ORAL at 11:09

## 2019-11-13 RX ADMIN — HYDRALAZINE HYDROCHLORIDE SCH MG: 25 TABLET, FILM COATED ORAL at 06:46

## 2019-11-13 RX ADMIN — CYANOCOBALAMIN TAB 500 MCG SCH MCG: 500 TAB at 11:07

## 2019-11-13 RX ADMIN — BENAZEPRIL HYDROCHLORIDE SCH MG: 20 TABLET, COATED ORAL at 11:15

## 2019-11-13 RX ADMIN — ENOXAPARIN SODIUM SCH MG: 40 INJECTION SUBCUTANEOUS at 11:11

## 2019-11-13 RX ADMIN — MULTIPLE VITAMINS W/ MINERALS TAB SCH TAB: TAB at 11:07

## 2019-11-13 RX ADMIN — Medication SCH MLS/HR: at 08:30

## 2019-11-13 RX ADMIN — ACETAMINOPHEN SCH MG: 650 TABLET, FILM COATED, EXTENDED RELEASE ORAL at 06:45

## 2019-11-13 RX ADMIN — Medication SCH: at 11:31

## 2019-11-13 RX ADMIN — Medication SCH MLS/HR: at 18:37

## 2019-11-13 NOTE — IPNPDOC
Date Seen


The patient was seen on 11/13/19.





Progress Note








Medical Oncology 





I spoke with the patient's   wife at the bedside in ICU 





The patient  is now intubated   due to  ascending  paralysis  in the  face of  

stage IV  lung  cancer 





The  discussion regarding the treatment  of the  Guillan  [place]  was  reviewed  

as well as  in the absence of  caner, the long rehabilitation associated  with  

the disease / 





The life expectancy of the patient   with stage IV  lung cancer  was also 

discussed . The patent's wife   will  discuss  the  transfer to  [facility] 

with her  daughters  who are local .  





The patient has  a poor  prognosis    form the lung cancer itself  and is  grave

with the  guillan barre associated with it .End of  life care  was  approached 

as  well as  a possibilty   in  his  care  





Thank you 





Ani Ely





VS, I&O, 24H, Swati


Vital Signs/I&O





Vital Signs








  Date Time  Temp Pulse Resp B/P (MAP) Pulse Ox O2 Delivery O2 Flow Rate FiO2


 


11/13/19 11:30  85  110/61 (77) 97 Ventilator  35


 


11/13/19 11:08   19    35.0 


 


11/13/19 07:38 97.5       














I&O- Last 24 Hours up to 6 AM 


 


 11/13/19





 05:59


 


Intake Total 780 ml


 


Output Total 1100 ml


 


Balance -320 ml











Laboratory Data


24H LABS


Laboratory Tests 2


11/13/19 04:58: 


Nucleated Red Blood Cells % (auto) 0.0, Anion Gap 4L, Glomerular Filtration Rate

> 60.0, Calcium Level 9.2, Magnesium Level 2.0


11/13/19 07:43: 


Blood Gas Bicarbonate Standard 27.5H, Arterial Blood pH 7.432, Arterial Blood 

Partial Pressure CO2 43.6, Arterial Blood Partial Pressure O2 69.3L, Arterial 

Blood Total CO2 29.8, Arterial Blood HCO3 28.4H, Arterial Blood Base Excess 

3.6H, Arterial Blood Oxygen Saturation 93.6L


11/13/19 10:42: 


Blood Gas Bicarbonate Standard 29.3H, Arterial Blood pH 7.488H, Arterial Blood 

Partial Pressure CO2 39.2, Arterial Blood Partial Pressure O2 79.7, Arterial 

Blood Total CO2 30.3, Arterial Blood HCO3 29.1H, Arterial Blood Base Excess 

5.4H, Arterial Blood Oxygen Saturation 95.1


11/13/19 12:14: Bedside Glucose (Misc Panel) 132H


CBC/BMP


Laboratory Tests


11/13/19 04:58








Microbiology





Microbiology


11/7/19 Gram Stain - Final, Complete


          


11/7/19 CSF Culture - Final, Complete











Ani Lopez MD                Nov 13, 2019 13:00

## 2019-11-13 NOTE — REP
Portable supine chest x-ray:  Single view. 08:27 a.m. film.

 

History:  Status post intubation.

 

Comparison study:  12:50 a.m. on this same date.

 

Findings:  Endotracheal tube is seen in good position at the level of proximal

clavicles.  NG tube enters the gastric fundus.  Monitoring electrodes are noted.

The left lung remains clear.  There is new plate-like atelectasis in the right

base.  There are old healed rib fractures on the right.

 

Impression:

 

Endotracheal tube in good position.  Plate-like atelectasis right base.

 

 

Electronically Signed by

Delroy Felix MD 11/13/2019 08:42 A

## 2019-11-13 NOTE — REP
CT brain:  11/13/2019.

 

Indication:  Mental status change.  Stroke.

 

Comparison:  MRI dated 11/07/2019.

 

Technique:  Unenhanced axial CT images of the brain were obtained from skull base

to vertex.

 

Findings:

 

There is no acute intracranial hemorrhage, acute cortical infarction, mass effect

or hydrocephalous.  Chronic left cerebellar infarction is present.  There are

patchy areas of hypoattenuation throughout the cerebral hemisphere white matter

most consistent with chronic small vessel disease.

 

Impression:

 

No acute intracranial process.

 

 

Electronically Signed by

Umesh Thompson DO 11/13/2019 09:43 A

## 2019-11-13 NOTE — CCN
DATE:  11/13/2019

 

I was emergently called to Mr. Venegas's bedside.  His admission had been for

progressive weakness and was diagnosed with Guillain-[place].  He also had been

found to have a metastatic adenocarcinoma.  Because of his diagnosis it was felt

that he needed to be intubated.  I was present during the intubation.  After

discussion with Dr. Gondal, he wanted to stay on as attending and we are going to

co-manage intensive care issues.

 

His ventilator was set up PRVC, tidal volume of 450 (6 mL/kg for predicted body

weight), rate of 14 and PEEP of 5.

 

His chest x-ray showed adequate position of the endotracheal tube.  It has been

advanced 1 cm since that x-ray.  Chest CT scan did not show any acute pathology.

 

 

His arterial blood gas on the above settings was 7.49/39/80 with a measured

saturation of 95% and a base excess of 5.4.  He was overbreathing the vent at a

rate of 17-18.

 

Neurology had been contacted and recommended plasmapheresis.  Arrangements are

being made for him to be transferred to [facility].  If this transfer does not

occur, we will convert this to a complete consultation.

 

DIAGNOSIS:  Intubation mechanical ventilation secondary to decreased mental

status.

 

Critical care time 20 minutes not including procedure time.

## 2019-11-13 NOTE — REPVR
PROCEDURE INFORMATION: 

Exam: XR Chest, 1 View 

Exam date and time: 11/13/2019 12:58 AM 

Clinical history: 64 years old, male; Other: Inc o2; Additional info: Increased 

oxygen requirement 



TECHNIQUE: 

Imaging protocol: XR of the chest 

Views: 1 view. 



COMPARISON: 

CR Chest, 1 view 11/11/2019 1:36 PM 



FINDINGS: 

Lungs: There is decreased inflation of the lungs which is similar to the prior 

study. The lungs are unchanged.There are no interval infiltrates. 

Pleural space: Unremarkable. No pleural effusion. No pneumothorax. 

Heart/Mediastinum: The heart and mediastinum are unchanged. 

Bones/joints: Old right rib fractures are again noted. 



IMPRESSION: 

Essentially stable poor inspiratory chest since 11/11/2019. No acute interval 

process is identified. 



Electronically signed by: Carmelo Altman On 11/13/2019  01:24:27 AM

## 2019-11-13 NOTE — DS.PDOC
Discharge Summary


General


Date of Admission


Nov 7, 2019 at 22:08


Date of Discharge


11/13/2019


Attending Physician:  GONDAL,KHUBAIB N. MD





Discharge Summary


PROCEDURES PERFORMED DURING STAY: Endotracheal intubation, IVIG.





ADMITTING DIAGNOSES: 


1. Progressive weakness.





DISCHARGE DIAGNOSES:


1. Paraneoplastic Guillain-Barr syndrome.





COMPLICATIONS/CHIEF COMPLAINT: Lower Extremity Weakness.





HISTORY OF PRESENT ILLNESS: 64-year-old male with past medical history of 

hypertension, hyperlipidemia, prostate cancer status post prostatectomy, was 

admitted for progressive weakness. Weakness started in bilateral lower 

extremities, breast proximally, soon involved bilateral upper extremities and 

even his eyes/speech ability. Patient was diagnosed with presumptive Guillain-

Barr syndrome, on imaging found to have multiple lung and bone lesions, 

underwent CT-guided lung biopsy which showed primary adenocarcinoma of the lung.

Based on patient's new lung cancer diagnosis and symptoms, patient likely has 

paraneoplastic Guillain-Barr syndrome. Patient was doing reasonably well up 

until last night when he started to progressively get weaker, to the point of 

unresponsiveness. When I evaluated the patient the morning. He was not able to 

move any of his extremities, not able to open his eyes or respond to vocal or 

tactile stimuli, patient was completely flaccid despite noxious stimuli. There 

was concern for patient's inability to maintain his airway, given progressive 

weakness, he was emergently intubated and placed on mechanical ventilation. 

Patient underwent repeat CT scan of the head after being intubated, no acute 

pathology was noted to justify change in patient's acute symptoms. Case was 

discussed with neurologist, who recommended possible benefit from 

plasmapheresis. Case was also discussed with oncologist who agrees that patient 

possibly has paraneoplastic Guillain-Barr syndrome. [facility] was 

contacted regarding a possible transfer, case was discussed with intensivist 

(Dr. Osorio), who agreed to accept the patient. I was notified that there is a 

bed available currently for the patient, arrangements were made to have the 

patient transferred by air transport. Patient is currently hemodynamically 

stable for discharge and transferred to [facility]. Patient will 

remain on propofol drip with when necessary Ativan for sedation.





HOSPITAL COURSE: As above. 





DISCHARGE MEDICATIONS: Please see below.


 


ALLERGIES: Please see below.





PHYSICAL EXAMINATION:


VITAL SIGNS: Please see below.


GENERAL: No distress


HEENT: Pupils unequal, not reacting to light, size 8 ET tube in place


NECK: Supple


CARDIOVASCULAR EXAMINATION: S1, S2, no murmurs


RESPIRATORY EXAMINATION: Clear to auscultation, no wheezing


ABDOMINAL EXAMINATION: Soft, nontender, nondistended, positive bowel sounds


EXTREMITIES: Moving upper extremities


SKIN: No rash


NEUROLOGICAL EXAMINATION: Able to follow simple commands 


PSYCHIATRIC EXAMINATION: Sedated





LABORATORY DATA: Please see below.





PROGNOSIS: Poor





ACTIVITY: As tolerated.





DIET: Tube feeding





DISCHARGE PLAN: [facility] 

care





DISPOSITION: [facility].





DISCHARGE INSTRUCTIONS:


1. As above.





DISCHARGE CONDITION: Stable.





TIME SPENT ON DISCHARGE: Greater than 75 minutes.





Vital Signs/I&Os





Vital Signs








  Date Time  Temp Pulse Resp B/P (MAP) Pulse Ox O2 Delivery O2 Flow Rate FiO2


 


11/13/19 16:37     97 Ventilator  30


 


11/13/19 16:26   20     


 


11/13/19 16:00 97.6 77  115/70 (85)    


 


11/13/19 11:08       35.0 














I&O- Last 24 Hours up to 6 AM 


 


 11/13/19





 06:00


 


Intake Total 720 ml


 


Output Total 675 ml


 


Balance 45 ml











Laboratory Data


Labs 24H


Laboratory Tests 2


11/13/19 04:58: 


Nucleated Red Blood Cells % (auto) 0.0, Anion Gap 4L, Glomerular Filtration Rate

> 60.0, Calcium Level 9.2, Magnesium Level 2.0


11/13/19 07:43: 


Blood Gas Bicarbonate Standard 27.5H, Arterial Blood pH 7.432, Arterial Blood 

Partial Pressure CO2 43.6, Arterial Blood Partial Pressure O2 69.3L, Arterial 

Blood Total CO2 29.8, Arterial Blood HCO3 28.4H, Arterial Blood Base Excess 

3.6H, Arterial Blood Oxygen Saturation 93.6L


11/13/19 10:42: 


Blood Gas Bicarbonate Standard 29.3H, Arterial Blood pH 7.488H, Arterial Blood 

Partial Pressure CO2 39.2, Arterial Blood Partial Pressure O2 79.7, Arterial 

Blood Total CO2 30.3, Arterial Blood HCO3 29.1H, Arterial Blood Base Excess 

5.4H, Arterial Blood Oxygen Saturation 95.1


11/13/19 12:14: Bedside Glucose (Misc Panel) 132H


CBC/BMP


Laboratory Tests


11/13/19 04:58








FSBS





Laboratory Tests








Test


 11/13/19


12:14 Range/Units


 


 


Bedside Glucose (Misc Panel) 132   MG/DL











Microbiology





Microbiology


11/7/19 Gram Stain - Final, Complete


          


11/7/19 CSF Culture - Final, Complete





Discharge Medications


Scheduled


Amlodipine/Benazepril (Lotrel 5-20 mg Capsule) 1 Each Capsule, 1 CAP PO QHS, 

(Reported)


Aspirin (Aspirin EC) 81 Mg Tablet.dr, 81 MG PO DAILY, (Reported)


Atorvastatin Calcium (Atorvastatin Calcium) 10 Mg Tab, 10 MG PO QHS, (Reported)


Venlafaxine HCl (Effexor Xr) 150 Mg Cap.er.24h, 150 MG PO DAILY, (Reported)





Scheduled PRN


Calcium Carbonate (Tums) 500 Mg Chw, 1,000 MG PO Q4H PRN for 

HEARTBURN/INDIGESTION, (Reported)


Loratadine (Loratadine) 10 Mg Tablet, 10 MG PO DAILY PRN for ALLERGY SYMPTOMS, 

(Reported)


Oxymetazoline HCl (No Drip) 0.05% Spray, 2 SPRAYS NA QHS PRN for NASAL 

CONGESTION, (Reported)





Allergies


Coded Allergies:  


     No Known Allergies (Unverified , 11/7/19)











GONDAL,KHUBAIB N. MD           Nov 13, 2019 18:11

## 2019-11-15 LAB — ACHR BIND AB SER-SCNC: < 0.03 NMOL/L (ref 0–0.24)

## 2019-11-22 ENCOUNTER — HOSPITAL ENCOUNTER (INPATIENT)
Dept: HOSPITAL 53 - M MSPAV | Age: 64
LOS: 2 days | DRG: 951 | End: 2019-11-24
Attending: INTERNAL MEDICINE | Admitting: INTERNAL MEDICINE
Payer: MEDICARE

## 2019-11-22 VITALS — SYSTOLIC BLOOD PRESSURE: 142 MMHG | DIASTOLIC BLOOD PRESSURE: 83 MMHG

## 2019-11-22 DIAGNOSIS — Z51.5: Primary | ICD-10-CM

## 2019-11-22 DIAGNOSIS — G93.41: ICD-10-CM

## 2019-11-22 DIAGNOSIS — G61.0: ICD-10-CM

## 2019-11-22 DIAGNOSIS — Z79.82: ICD-10-CM

## 2019-11-22 DIAGNOSIS — E78.5: ICD-10-CM

## 2019-11-22 DIAGNOSIS — Z79.899: ICD-10-CM

## 2019-11-22 DIAGNOSIS — Z87.891: ICD-10-CM

## 2019-11-22 DIAGNOSIS — C34.90: ICD-10-CM

## 2019-11-22 DIAGNOSIS — Z66: ICD-10-CM

## 2019-11-22 DIAGNOSIS — I10: ICD-10-CM

## 2019-11-22 NOTE — HPEPDOC
[facility] Medical History & Physical


Date of Admission


Nov 22, 2019


Date of Service:  Nov 22, 2019


Attending Physician:  GONDAL,KHUBAIB N. MD





History and Physical


CHIEF COMPLAINT: Transferred from [facility]





HISTORY OF PRESENT ILLNESS: 64-year-old male with past medical history of 

hypertension, hyperlipidemia, newly diagnosed metastatic adenocarcinoma of the 

lung with paraneoplastic Guillain-Barr syndrome is transferred back from [facility]. Patient initially presented to [facility] on 

November 7 with progressive weakness starting in the legs moving up to his arms,

patient diagnosed Guillain-Barr syndrome, treated with IVIG, found to have lung

and bone lesions, underwent lung biopsy which was positive for primary 

adenocarcinoma of the lung. Patient was likely to have paraneoplastic Guillain-

Barr syndrome from adenocarcinoma of the lung. Patient became completely 

flaccid, was emergently intubated to protect his airway and transferred to 

[facility] for further therapy including plasmapheresis. He received 

5 sessions of plasmapheresis at [facility], evaluated by oncology, 

patient finally decided to not pursue any treatment options for the lung cancer 

as he continues to have near flaccid paralysis. MOLST form with DNR/DNI with 

comfort measures only signed at [facility] and patient is transferred

back to [facility] with anticipation of hospice, this coming 

Monday. Patient seen in his room, confused, thinks he is supposed to get 

physical therapy tonight, thinks she is going to get chemotherapy for his 

cancer. Family is not present at this time. Patient has no complains at this 

time





PAST MEDICAL HISTORY:


1. Adenocarcinoma of the lung with paraneoplastic Guillain-Barr syndrome.


2. Hypertension.


3. , Hyperlipidemia.





SOCIAL HISTORY:


Heavy smoker.


Drinks alcohol.


Denies drug use





FAMILY HISTORY:


Noncontributory 





ALLERGIES: Please see below.





HOME MEDICATIONS: Please see below. 





PHYSICAL EXAMINATION:


VITAL SIGNS: Please see below.


GENERAL: No distress


HEENT: Normocephalic, atraumatic, moist mucous membranes, has NG tube in place


NECK: Supple


CARDIOVASCULAR EXAMINATION: S1, S2, no murmurs


RESPIRATORY EXAMINATION: Clear to auscultation, no wheezing


ABDOMINAL EXAMINATION: Soft, nontender, nondistended, positive bowel sounds


EXTREMITIES: No edema


SKIN: No rash


NEUROLOGICAL EXAMINATION: Alert and oriented 3, unable to move lower 

extremities, minimal movement noted in toes, can lift left arm against gravity, 

can move right arm was significantly weaker than left 


PSYCHIATRIC EXAMINATION: Calm and cooperative





LABORATORY DATA: See below.





MICROBIOLOGY: Please see below. 





ASSESSMENT: 64-year-old smoker with newly diagnosed adenocarcinoma of the lung 

with paraneoplastic Guillain-Barr syndrome is admitted for comfort measures onl

y with anticipation for hospice care.





PLAN:


1. Adenocarcinoma of the lung with paraneoplastic Guillain-Weeks syndrome.


 Continues to have severe weakness in all extremities, MOLST form with DNR/DNI 

and comfort measures only signed at [facility].


 Morphine and Xanax as needed for pain/comfort


 No other treatments will be provided at this time other than for the purpose 

of making the patient comfortable.





Vital Signs





Vital Signs








  Date Time  Temp Pulse Resp B/P (MAP) Pulse Ox O2 Delivery O2 Flow Rate FiO2


 


11/22/19 17:06 98.2 97 18 142/83 (102) 94 Nasal Cannula 5.0 











Home Medications


Scheduled


Amlodipine/Benazepril (Lotrel 5-20 mg Capsule) 1 Each Capsule, 1 CAP PO QHS


Aspirin (Aspirin EC) 81 Mg Tablet.dr, 81 MG PO DAILY


Atorvastatin Calcium (Atorvastatin Calcium) 10 Mg Tab, 10 MG PO QHS


Venlafaxine HCl (Effexor Xr) 150 Mg Cap.er.24h, 150 MG PO DAILY





Scheduled PRN


Calcium Carbonate (Tums) 500 Mg Chw, 1,000 MG PO Q4H PRN for HEARTBURN/IND

IGESTION


Loratadine (Loratadine) 10 Mg Tablet, 10 MG PO DAILY PRN for ALLERGY SYMPTOMS


Oxymetazoline HCl (No Drip) 0.05% Spray, 2 SPRAYS NA QHS PRN for NASAL 

CONGESTION





Allergies


Coded Allergies:  


     No Known Allergies (Unverified , 11/7/19)





A-FIB/CHADSVASC


A-FIB History


Current/History of A-Fib/PAF?:  No











GONDAL,KHUBAIB N. MD           Nov 22, 2019 17:54

## 2019-11-23 RX ADMIN — DEXTROSE AND SODIUM CHLORIDE SCH MLS/HR: 5; 900 INJECTION, SOLUTION INTRAVENOUS at 09:05

## 2019-11-23 RX ADMIN — DEXTROSE AND SODIUM CHLORIDE SCH MLS/HR: 5; 900 INJECTION, SOLUTION INTRAVENOUS at 21:56

## 2019-11-23 RX ADMIN — MORPHINE SULFATE PRN MG: 2 INJECTION, SOLUTION INTRAMUSCULAR; INTRAVENOUS at 17:34

## 2019-11-23 NOTE — IPNPDOC
Date Seen


The patient was seen on 11/23/19.





Progress Note


11/23/2019


Had a long discussion with wife regarding goals of care, she wishes to keep the 

patient comfort measures only, her main concern is pain control along with 

pleasure feeds. Patient is awake but confused, thinks he is in [place], without 

any complaints at this time. As per wife's requests gentle IV hydration was 

started, along with pleasure feeds with the understanding that patient may be 

aspirating part of the feeds, which the wife is okay with as long as the patient

is comfortable and getting to enjoy his ice cream. Plan is for home hospice 

hospice on Monday.





VS, I&O, 24H, Fishbone


Vital Signs/I&O





Vital Signs








  Date Time  Temp Pulse Resp B/P (MAP) Pulse Ox O2 Delivery O2 Flow Rate FiO2


 


11/22/19 23:03       5.0 


 


11/22/19 17:06 98.2 97 18 142/83 (102) 94 Nasal Cannula  














I&O- Last 24 Hours up to 6 AM 


 


 11/23/19





 06:00


 


Intake Total 100 ml


 


Output Total 1050 ml


 


Balance -950 ml

















GONDAL,KHUBAIB N. MD           Nov 23, 2019 11:57

## 2019-11-24 RX ADMIN — MORPHINE SULFATE PRN MG: 2 INJECTION, SOLUTION INTRAMUSCULAR; INTRAVENOUS at 02:47

## 2019-11-24 RX ADMIN — MORPHINE SULFATE PRN MG: 2 INJECTION, SOLUTION INTRAMUSCULAR; INTRAVENOUS at 16:10

## 2019-11-24 RX ADMIN — DEXTROSE AND SODIUM CHLORIDE SCH MLS/HR: 5; 900 INJECTION, SOLUTION INTRAVENOUS at 08:21

## 2019-11-24 RX ADMIN — MORPHINE SULFATE PRN MG: 2 INJECTION, SOLUTION INTRAMUSCULAR; INTRAVENOUS at 10:20

## 2019-11-24 RX ADMIN — MORPHINE SULFATE PRN MG: 2 INJECTION, SOLUTION INTRAMUSCULAR; INTRAVENOUS at 01:35

## 2019-11-24 RX ADMIN — MORPHINE SULFATE PRN MG: 2 INJECTION, SOLUTION INTRAMUSCULAR; INTRAVENOUS at 14:14

## 2019-11-24 RX ADMIN — MORPHINE SULFATE PRN MG: 2 INJECTION, SOLUTION INTRAMUSCULAR; INTRAVENOUS at 01:32

## 2019-11-24 NOTE — DS.PDOC
Discharge Summary


General


Date of Admission


2019 at 17:04


Date of Discharge


2019


Attending Physician:  GONDAL,KHUBAIB N. MD





Discharge Summary


PROCEDURES PERFORMED DURING STAY: None.





ADMITTING DIAGNOSES: 


1. Guillain-Barr syndrome, lung cancer.





DISCHARGE DIAGNOSES:


1. 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





HISTORY OF PRESENT ILLNESS: 64-year-old male with newly diagnosed adenocarcinoma

of the lung with paraneoplastic Guillain-Barr syndrome was transferred back 

from [facility] with comfort measures only. Patient was expected to 

go home with home hospice on Monday, remained comfort measures only, was treated

with morphine. Patient was comfortable during her stay, notified by nurse that 

patient has passed away, time of death 1740.





HOSPITAL COURSE: As above. 





DISPOSITION: .





TIME SPENT ON DISCHARGE: Greater than 15 minutes.





Vital Signs/I&Os





Vital Signs








  Date Time  Temp Pulse Resp B/P (MAP) Pulse Ox O2 Delivery O2 Flow Rate FiO2


 


19 09:00       6.0 


 


19 02:57   28   Nasal Cannula  


 


19 17:06 98.2 97  142/83 (102) 94   














I&O- Last 24 Hours up to 6 AM 


 


 19





 06:00


 


Intake Total 1680 ml


 


Output Total 1395 ml


 


Balance 285 ml











Discharge Medications


Scheduled


Amlodipine/Benazepril (Lotrel 5-20 mg Capsule) 1 Each Capsule, 1 CAP PO QHS, 

(Reported)


Aspirin (Aspirin EC) 81 Mg Tablet.dr, 81 MG PO DAILY, (Reported)


Atorvastatin Calcium (Atorvastatin Calcium) 20 Mg Tablet, 20 MG PO QHS, 

(Reported)





Scheduled PRN


Calcium Carbonate (Tums) 500 Mg Chw, 1,000 MG PO Q4H PRN for 

HEARTBURN/INDIGESTION, (Reported)


Loratadine (Loratadine) 10 Mg Tablet, 10 MG PO DAILY PRN for ALLERGY SYMPTOMS, 

(Reported)


Meclizine HCl (Meclizine HCl) 25 Mg Tablet, 25 MG PO Q8H PRN for DIZZINESS, 

(Reported)


Oxymetazoline HCl (No Drip) 0.05% Spray, 2 SPRAYS NA QHS PRN for NASAL 

CONGESTION, (Reported)


Sildenafil Citrate (Sildenafil Citrate) 100 Mg Tablet, 100 MG PO DAILY PRN for 

ERECTILE DYSFUNCTION, (Reported)


Venlafaxine HCl (Effexor Xr) 150 Mg Cap.er.24h, 150 MG PO DAILY PRN for 

MOOD/ANXIETY, (Reported)


   PATIENT STATES HE TAKES AS NEEDED 





Allergies


Coded Allergies:  


     No Known Allergies (Unverified , 19)











GONDAL,KHUBAIB N. MD           2019 19:37